# Patient Record
Sex: FEMALE | Race: WHITE | NOT HISPANIC OR LATINO | Employment: OTHER | URBAN - NONMETROPOLITAN AREA
[De-identification: names, ages, dates, MRNs, and addresses within clinical notes are randomized per-mention and may not be internally consistent; named-entity substitution may affect disease eponyms.]

---

## 2018-01-01 ENCOUNTER — APPOINTMENT (OUTPATIENT)
Dept: CT IMAGING | Facility: HOSPITAL | Age: 63
End: 2018-01-01

## 2018-01-01 ENCOUNTER — HOSPITAL ENCOUNTER (INPATIENT)
Facility: HOSPITAL | Age: 63
LOS: 3 days | Discharge: HOSPICE | End: 2018-10-26
Attending: EMERGENCY MEDICINE | Admitting: FAMILY MEDICINE

## 2018-01-01 ENCOUNTER — DOCUMENTATION (OUTPATIENT)
Dept: ONCOLOGY | Facility: HOSPITAL | Age: 63
End: 2018-01-01

## 2018-01-01 ENCOUNTER — CONSULT (OUTPATIENT)
Dept: ONCOLOGY | Facility: CLINIC | Age: 63
End: 2018-01-01

## 2018-01-01 ENCOUNTER — HOSPITAL ENCOUNTER (OUTPATIENT)
Dept: MRI IMAGING | Facility: HOSPITAL | Age: 63
Discharge: HOME | End: 2018-10-05
Attending: INTERNAL MEDICINE | Admitting: INTERNAL MEDICINE

## 2018-01-01 ENCOUNTER — OFFICE VISIT (OUTPATIENT)
Dept: ONCOLOGY | Facility: CLINIC | Age: 63
End: 2018-01-01

## 2018-01-01 ENCOUNTER — INFUSION (OUTPATIENT)
Dept: ONCOLOGY | Facility: HOSPITAL | Age: 63
End: 2018-01-01

## 2018-01-01 ENCOUNTER — APPOINTMENT (OUTPATIENT)
Dept: SPEECH THERAPY | Facility: HOSPITAL | Age: 63
End: 2018-01-01
Attending: INTERNAL MEDICINE

## 2018-01-01 ENCOUNTER — HOSPITAL ENCOUNTER (OUTPATIENT)
Dept: CT IMAGING | Facility: HOSPITAL | Age: 63
Discharge: HOME | End: 2018-10-08
Attending: INTERNAL MEDICINE

## 2018-01-01 ENCOUNTER — HOSPITAL ENCOUNTER (OUTPATIENT)
Dept: CT IMAGING | Facility: HOSPITAL | Age: 63
Discharge: HOME | End: 2018-10-08
Attending: INTERNAL MEDICINE | Admitting: INTERNAL MEDICINE

## 2018-01-01 ENCOUNTER — APPOINTMENT (OUTPATIENT)
Dept: ONCOLOGY | Facility: HOSPITAL | Age: 63
End: 2018-01-01

## 2018-01-01 ENCOUNTER — APPOINTMENT (OUTPATIENT)
Dept: LAB | Facility: HOSPITAL | Age: 63
End: 2018-01-01

## 2018-01-01 ENCOUNTER — INFUSION (OUTPATIENT)
Dept: ONCOLOGY | Facility: HOSPITAL | Age: 63
End: 2018-01-01
Attending: INTERNAL MEDICINE

## 2018-01-01 ENCOUNTER — TELEPHONE (OUTPATIENT)
Dept: ONCOLOGY | Facility: CLINIC | Age: 63
End: 2018-01-01

## 2018-01-01 ENCOUNTER — HOSPITAL ENCOUNTER (OUTPATIENT)
Age: 63
Setting detail: OUTPATIENT SURGERY
Discharge: HOME | End: 2018-10-11
Attending: SPECIALIST | Admitting: SPECIALIST

## 2018-01-01 ENCOUNTER — HOSPITAL ENCOUNTER (OUTPATIENT)
Dept: ULTRASOUND IMAGING | Facility: HOSPITAL | Age: 63
Discharge: HOME | End: 2018-10-17
Attending: INTERNAL MEDICINE | Admitting: INTERNAL MEDICINE

## 2018-01-01 ENCOUNTER — HOSPITAL ENCOUNTER (OUTPATIENT)
Dept: GENERAL RADIOLOGY | Age: 63
Discharge: HOME | End: 2018-10-11
Payer: MEDICARE

## 2018-01-01 ENCOUNTER — ANESTHESIA EVENT (OUTPATIENT)
Dept: OPERATING ROOM | Age: 63
End: 2018-01-01

## 2018-01-01 ENCOUNTER — HOSPITAL ENCOUNTER (OUTPATIENT)
Dept: PREADMISSION TESTING | Age: 63
Setting detail: OUTPATIENT SURGERY
Discharge: HOME | End: 2018-10-12

## 2018-01-01 ENCOUNTER — LAB (OUTPATIENT)
Dept: LAB | Facility: HOSPITAL | Age: 63
End: 2018-01-01

## 2018-01-01 ENCOUNTER — HOSPITAL ENCOUNTER (OUTPATIENT)
Dept: ULTRASOUND IMAGING | Facility: HOSPITAL | Age: 63
Discharge: HOME | End: 2018-10-17
Attending: INTERNAL MEDICINE

## 2018-01-01 ENCOUNTER — HOSPITAL ENCOUNTER (OUTPATIENT)
Dept: GENERAL RADIOLOGY | Age: 63
Discharge: HOME | End: 2018-10-08
Payer: MEDICARE

## 2018-01-01 ENCOUNTER — APPOINTMENT (OUTPATIENT)
Dept: GENERAL RADIOLOGY | Facility: HOSPITAL | Age: 63
End: 2018-01-01

## 2018-01-01 ENCOUNTER — APPOINTMENT (OUTPATIENT)
Dept: ONCOLOGY | Facility: HOSPITAL | Age: 63
End: 2018-01-01
Attending: INTERNAL MEDICINE

## 2018-01-01 ENCOUNTER — ANESTHESIA (OUTPATIENT)
Dept: OPERATING ROOM | Age: 63
End: 2018-01-01

## 2018-01-01 VITALS
HEART RATE: 91 BPM | OXYGEN SATURATION: 96 % | RESPIRATION RATE: 22 BRPM | HEIGHT: 63 IN | DIASTOLIC BLOOD PRESSURE: 61 MMHG | TEMPERATURE: 98.4 F | WEIGHT: 119 LBS | SYSTOLIC BLOOD PRESSURE: 116 MMHG | BODY MASS INDEX: 21.09 KG/M2

## 2018-01-01 VITALS
RESPIRATION RATE: 16 BRPM | TEMPERATURE: 97.7 F | DIASTOLIC BLOOD PRESSURE: 68 MMHG | BODY MASS INDEX: 21.09 KG/M2 | HEIGHT: 63 IN | SYSTOLIC BLOOD PRESSURE: 112 MMHG | OXYGEN SATURATION: 94 % | WEIGHT: 119 LBS | HEART RATE: 96 BPM

## 2018-01-01 VITALS
TEMPERATURE: 98.3 F | SYSTOLIC BLOOD PRESSURE: 98 MMHG | HEART RATE: 103 BPM | WEIGHT: 113 LBS | BODY MASS INDEX: 20.02 KG/M2 | HEIGHT: 63 IN | DIASTOLIC BLOOD PRESSURE: 66 MMHG | OXYGEN SATURATION: 90 % | RESPIRATION RATE: 16 BRPM

## 2018-01-01 VITALS
HEIGHT: 62 IN | TEMPERATURE: 98.2 F | SYSTOLIC BLOOD PRESSURE: 102 MMHG | RESPIRATION RATE: 18 BRPM | BODY MASS INDEX: 23.27 KG/M2 | DIASTOLIC BLOOD PRESSURE: 56 MMHG | HEART RATE: 106 BPM | OXYGEN SATURATION: 93 % | WEIGHT: 126.44 LBS

## 2018-01-01 VITALS
DIASTOLIC BLOOD PRESSURE: 74 MMHG | WEIGHT: 122.6 LBS | HEIGHT: 63 IN | SYSTOLIC BLOOD PRESSURE: 122 MMHG | OXYGEN SATURATION: 94 % | BODY MASS INDEX: 21.72 KG/M2 | RESPIRATION RATE: 18 BRPM | TEMPERATURE: 97.3 F | HEART RATE: 105 BPM

## 2018-01-01 VITALS — SYSTOLIC BLOOD PRESSURE: 111 MMHG | DIASTOLIC BLOOD PRESSURE: 69 MMHG | OXYGEN SATURATION: 99 %

## 2018-01-01 VITALS
BODY MASS INDEX: 20.02 KG/M2 | HEART RATE: 93 BPM | SYSTOLIC BLOOD PRESSURE: 91 MMHG | OXYGEN SATURATION: 94 % | WEIGHT: 113 LBS | HEIGHT: 63 IN | DIASTOLIC BLOOD PRESSURE: 60 MMHG | RESPIRATION RATE: 18 BRPM | TEMPERATURE: 97.9 F

## 2018-01-01 VITALS
RESPIRATION RATE: 16 BRPM | OXYGEN SATURATION: 90 % | TEMPERATURE: 98.4 F | WEIGHT: 113 LBS | DIASTOLIC BLOOD PRESSURE: 62 MMHG | HEIGHT: 63 IN | SYSTOLIC BLOOD PRESSURE: 108 MMHG | HEART RATE: 96 BPM | BODY MASS INDEX: 20.02 KG/M2

## 2018-01-01 VITALS — HEIGHT: 63 IN | BODY MASS INDEX: 20.04 KG/M2 | WEIGHT: 113.1 LBS

## 2018-01-01 DIAGNOSIS — N28.9 ACUTE RENAL INSUFFICIENCY: ICD-10-CM

## 2018-01-01 DIAGNOSIS — C78.7 COLON CANCER METASTASIZED TO LIVER (HCC): Primary | ICD-10-CM

## 2018-01-01 DIAGNOSIS — R53.1 GENERALIZED WEAKNESS: ICD-10-CM

## 2018-01-01 DIAGNOSIS — C18.9 MALIGNANT NEOPLASM OF COLON, UNSPECIFIED PART OF COLON (HCC): ICD-10-CM

## 2018-01-01 DIAGNOSIS — C18.6 MALIGNANT NEOPLASM OF DESCENDING COLON (HCC): ICD-10-CM

## 2018-01-01 DIAGNOSIS — C79.9 METASTATIC CANCER (HCC): ICD-10-CM

## 2018-01-01 DIAGNOSIS — Z13.29 SCREENING FOR THYROID DISORDER: Primary | ICD-10-CM

## 2018-01-01 DIAGNOSIS — E87.6 HYPOKALEMIA: Primary | ICD-10-CM

## 2018-01-01 DIAGNOSIS — C18.0 MALIGNANT NEOPLASM OF CECUM (HCC): ICD-10-CM

## 2018-01-01 DIAGNOSIS — C18.9 COLON CANCER METASTASIZED TO LIVER (HCC): Primary | ICD-10-CM

## 2018-01-01 DIAGNOSIS — N17.9 ACUTE RENAL FAILURE, UNSPECIFIED ACUTE RENAL FAILURE TYPE (HCC): ICD-10-CM

## 2018-01-01 DIAGNOSIS — R18.0 MALIGNANT ASCITES: ICD-10-CM

## 2018-01-01 DIAGNOSIS — R14.0 ABDOMINAL DISTENTION: ICD-10-CM

## 2018-01-01 DIAGNOSIS — Z01.811 PRE-OP CHEST EXAM: ICD-10-CM

## 2018-01-01 DIAGNOSIS — C18.6 MALIGNANT NEOPLASM OF DESCENDING COLON (HCC): Primary | ICD-10-CM

## 2018-01-01 DIAGNOSIS — E87.6 HYPOKALEMIA: ICD-10-CM

## 2018-01-01 DIAGNOSIS — G89.3 PAIN, CANCER: ICD-10-CM

## 2018-01-01 DIAGNOSIS — E87.5 HYPERKALEMIA: Primary | ICD-10-CM

## 2018-01-01 DIAGNOSIS — R14.0 ABDOMINAL DISTENTION: Primary | ICD-10-CM

## 2018-01-01 DIAGNOSIS — D64.9 ANEMIA, UNSPECIFIED TYPE: Primary | ICD-10-CM

## 2018-01-01 LAB
ALBUMIN SERPL-MCNC: 2.3 G/DL (ref 3.5–5)
ALBUMIN SERPL-MCNC: 2.8 G/DL (ref 3.5–5)
ALBUMIN SERPL-MCNC: 2.9 G/DL (ref 3.5–5)
ALBUMIN SERPL-MCNC: 3.2 G/DL (ref 3.5–5)
ALBUMIN SERPL-MCNC: 3.4 G/DL (ref 3.5–5)
ALBUMIN/GLOB SERPL: 0.9 G/DL (ref 1.1–2.5)
ALBUMIN/GLOB SERPL: 1 G/DL (ref 1.1–2.5)
ALBUMIN/GLOB SERPL: 1.1 G/DL (ref 1.1–2.5)
ALP SERPL-CCNC: 1167 U/L (ref 24–120)
ALP SERPL-CCNC: 1190 U/L (ref 24–120)
ALP SERPL-CCNC: 1411 U/L (ref 24–120)
ALP SERPL-CCNC: 436 U/L (ref 24–120)
ALP SERPL-CCNC: 720 U/L (ref 24–120)
ALT SERPL W P-5'-P-CCNC: 47 U/L (ref 0–54)
ALT SERPL W P-5'-P-CCNC: 47 U/L (ref 0–54)
ALT SERPL W P-5'-P-CCNC: 52 U/L (ref 0–54)
ALT SERPL W P-5'-P-CCNC: 52 U/L (ref 0–54)
ALT SERPL W P-5'-P-CCNC: 53 U/L (ref 0–54)
ANION GAP SERPL CALCULATED.3IONS-SCNC: 10 MMOL/L (ref 4–13)
ANION GAP SERPL CALCULATED.3IONS-SCNC: 14 MMOL/L (ref 4–13)
ANION GAP SERPL CALCULATED.3IONS-SCNC: 17 MMOL/L (ref 4–13)
ANION GAP SERPL CALCULATED.3IONS-SCNC: 18 MMOL/L (ref 4–13)
ANION GAP SERPL CALCULATED.3IONS-SCNC: 19 MMOL/L (ref 4–13)
ANION GAP SERPL CALCULATED.3IONS-SCNC: 22 MMOL/L (ref 4–13)
ANISOCYTOSIS BLD QL: ABNORMAL
APTT PPP: 29.7 SECONDS (ref 24.1–34.8)
AST SERPL-CCNC: 244 U/L (ref 7–45)
AST SERPL-CCNC: 252 U/L (ref 7–45)
AST SERPL-CCNC: 253 U/L (ref 7–45)
AST SERPL-CCNC: 307 U/L (ref 7–45)
AST SERPL-CCNC: 343 U/L (ref 7–45)
BACTERIA SPEC AEROBE CULT: NORMAL
BACTERIA SPEC AEROBE CULT: NORMAL
BACTERIA UR QL AUTO: ABNORMAL /HPF
BASOPHILS # BLD AUTO: 0.02 10*3/MM3 (ref 0–0.2)
BASOPHILS # BLD AUTO: 0.02 10*3/MM3 (ref 0–0.2)
BASOPHILS # BLD AUTO: 0.05 10*3/MM3 (ref 0–0.2)
BASOPHILS # BLD AUTO: 0.05 10*3/MM3 (ref 0–0.2)
BASOPHILS # BLD AUTO: 0.07 10*3/MM3 (ref 0–0.2)
BASOPHILS NFR BLD AUTO: 0.1 % (ref 0–2)
BASOPHILS NFR BLD AUTO: 0.1 % (ref 0–2)
BASOPHILS NFR BLD AUTO: 0.3 % (ref 0–2)
BASOPHILS NFR BLD AUTO: 0.5 % (ref 0–2)
BASOPHILS NFR BLD AUTO: 0.6 % (ref 0–2)
BILE AC SERPL-SCNC: 12 UMOL/L
BILIRUB SERPL-MCNC: 2 MG/DL (ref 0.1–1)
BILIRUB SERPL-MCNC: 4 MG/DL (ref 0.1–1)
BILIRUB SERPL-MCNC: 4.6 MG/DL (ref 0.1–1)
BILIRUB SERPL-MCNC: 5 MG/DL (ref 0.1–1)
BILIRUB SERPL-MCNC: 5.4 MG/DL (ref 0.1–1)
BILIRUB UR QL STRIP: ABNORMAL
BUN BLD-MCNC: 30 MG/DL (ref 5–21)
BUN BLD-MCNC: 44 MG/DL (ref 5–21)
BUN BLD-MCNC: 50 MG/DL (ref 5–21)
BUN BLD-MCNC: 51 MG/DL (ref 5–21)
BUN BLD-MCNC: 53 MG/DL (ref 5–21)
BUN BLD-MCNC: 56 MG/DL (ref 5–21)
BUN BLD-MCNC: 57 MG/DL (ref 5–21)
BUN BLD-MCNC: 9 MG/DL (ref 5–21)
BUN/CREAT SERPL: 12.3 (ref 7–25)
BUN/CREAT SERPL: 12.8 (ref 7–25)
BUN/CREAT SERPL: 14.6 (ref 7–25)
BUN/CREAT SERPL: 14.6 (ref 7–25)
BUN/CREAT SERPL: 15.1 (ref 7–25)
BUN/CREAT SERPL: 17 (ref 7–25)
BUN/CREAT SERPL: 20.1 (ref 7–25)
BUN/CREAT SERPL: 9.6 (ref 7–25)
CALCIUM SPEC-SCNC: 6 MG/DL (ref 8.4–10.4)
CALCIUM SPEC-SCNC: 7 MG/DL (ref 8.4–10.4)
CALCIUM SPEC-SCNC: 7.2 MG/DL (ref 8.4–10.4)
CALCIUM SPEC-SCNC: 7.2 MG/DL (ref 8.4–10.4)
CALCIUM SPEC-SCNC: 7.7 MG/DL (ref 8.4–10.4)
CALCIUM SPEC-SCNC: 7.8 MG/DL (ref 8.4–10.4)
CALCIUM SPEC-SCNC: 7.9 MG/DL (ref 8.4–10.4)
CALCIUM SPEC-SCNC: 8 MG/DL (ref 8.4–10.4)
CDCAE SERPL-SCNC: 8.1 UMOL/L
CHLORIDE SERPL-SCNC: 100 MMOL/L (ref 98–110)
CHLORIDE SERPL-SCNC: 100 MMOL/L (ref 98–110)
CHLORIDE SERPL-SCNC: 103 MMOL/L (ref 98–110)
CHLORIDE SERPL-SCNC: 88 MMOL/L (ref 98–110)
CHLORIDE SERPL-SCNC: 88 MMOL/L (ref 98–110)
CHLORIDE SERPL-SCNC: 94 MMOL/L (ref 98–110)
CHLORIDE SERPL-SCNC: 95 MMOL/L (ref 98–110)
CHLORIDE SERPL-SCNC: 97 MMOL/L (ref 98–110)
CHOLATE SERPL-SCNC: 3 UMOL/L
CLARITY UR: ABNORMAL
CO2 SERPL-SCNC: 16 MMOL/L (ref 24–31)
CO2 SERPL-SCNC: 17 MMOL/L (ref 24–31)
CO2 SERPL-SCNC: 17 MMOL/L (ref 24–31)
CO2 SERPL-SCNC: 19 MMOL/L (ref 24–31)
CO2 SERPL-SCNC: 21 MMOL/L (ref 24–31)
CO2 SERPL-SCNC: 23 MMOL/L (ref 24–31)
CO2 SERPL-SCNC: 28 MMOL/L (ref 24–31)
CO2 SERPL-SCNC: 37 MMOL/L (ref 24–31)
COLOR UR: ABNORMAL
CREAT BLD-MCNC: 0.94 MG/DL (ref 0.5–1.4)
CREAT BLD-MCNC: 2.44 MG/DL (ref 0.5–1.4)
CREAT BLD-MCNC: 2.84 MG/DL (ref 0.5–1.4)
CREAT BLD-MCNC: 3.29 MG/DL (ref 0.5–1.4)
CREAT BLD-MCNC: 3.38 MG/DL (ref 0.5–1.4)
CREAT BLD-MCNC: 3.42 MG/DL (ref 0.5–1.4)
CREAT BLD-MCNC: 3.44 MG/DL (ref 0.5–1.4)
CREAT BLD-MCNC: 3.63 MG/DL (ref 0.5–1.4)
CREAT UR-MCNC: 37.3 MG/DL
D-LACTATE SERPL-SCNC: 2.4 MMOL/L (ref 0.5–2)
D-LACTATE SERPL-SCNC: 4 MMOL/L (ref 0.5–2)
DEPRECATED RDW RBC AUTO: 59.7 FL (ref 40–54)
DEPRECATED RDW RBC AUTO: 75.3 FL (ref 40–54)
DEPRECATED RDW RBC AUTO: 76.9 FL (ref 40–54)
DEPRECATED RDW RBC AUTO: 79 FL (ref 40–54)
DEPRECATED RDW RBC AUTO: 79.5 FL (ref 40–54)
DEPRECATED RDW RBC AUTO: 85 FL (ref 40–54)
DO-CHOLATE SERPL-SCNC: 0.5 UMOL/L
EOSINOPHIL # BLD AUTO: 0.01 10*3/MM3 (ref 0–0.7)
EOSINOPHIL # BLD AUTO: 0.01 10*3/MM3 (ref 0–0.7)
EOSINOPHIL # BLD AUTO: 0.04 10*3/MM3 (ref 0–0.7)
EOSINOPHIL # BLD AUTO: 0.04 10*3/MM3 (ref 0–0.7)
EOSINOPHIL # BLD AUTO: 0.1 10*3/MM3 (ref 0–0.7)
EOSINOPHIL # BLD MANUAL: 0.13 10*3/MM3 (ref 0–0.7)
EOSINOPHIL NFR BLD AUTO: 0.1 % (ref 0–4)
EOSINOPHIL NFR BLD AUTO: 0.1 % (ref 0–4)
EOSINOPHIL NFR BLD AUTO: 0.3 % (ref 0–4)
EOSINOPHIL NFR BLD AUTO: 0.4 % (ref 0–4)
EOSINOPHIL NFR BLD AUTO: 0.8 % (ref 0–4)
EOSINOPHIL NFR BLD MANUAL: 3 % (ref 0–4)
ERYTHROCYTE [DISTWIDTH] IN BLOOD BY AUTOMATED COUNT: 18.2 % (ref 12–15)
ERYTHROCYTE [DISTWIDTH] IN BLOOD BY AUTOMATED COUNT: 22 % (ref 12–15)
ERYTHROCYTE [DISTWIDTH] IN BLOOD BY AUTOMATED COUNT: 22.3 % (ref 12–15)
ERYTHROCYTE [DISTWIDTH] IN BLOOD BY AUTOMATED COUNT: 22.3 % (ref 12–15)
ERYTHROCYTE [DISTWIDTH] IN BLOOD BY AUTOMATED COUNT: 22.4 % (ref 12–15)
ERYTHROCYTE [DISTWIDTH] IN BLOOD BY AUTOMATED COUNT: 22.5 % (ref 12–15)
FERRITIN SERPL-MCNC: 980 NG/ML (ref 11.1–264)
FOLATE SERPL-MCNC: 5.61 NG/ML
GFR SERPL CREATININE-BSD FRML MDRD: 13 ML/MIN/1.73
GFR SERPL CREATININE-BSD FRML MDRD: 14 ML/MIN/1.73
GFR SERPL CREATININE-BSD FRML MDRD: 14 ML/MIN/1.73
GFR SERPL CREATININE-BSD FRML MDRD: 17 ML/MIN/1.73
GFR SERPL CREATININE-BSD FRML MDRD: 20 ML/MIN/1.73
GFR SERPL CREATININE-BSD FRML MDRD: 60 ML/MIN/1.73
GFR SERPL CREATININE-BSD FRML MDRD: ABNORMAL ML/MIN/1.73
GLOBULIN UR ELPH-MCNC: 2.5 GM/DL
GLOBULIN UR ELPH-MCNC: 3 GM/DL
GLOBULIN UR ELPH-MCNC: 3.1 GM/DL
GLOBULIN UR ELPH-MCNC: 3.2 GM/DL
GLOBULIN UR ELPH-MCNC: 3.5 GM/DL
GLUCOSE BLD-MCNC: 65 MG/DL (ref 70–100)
GLUCOSE BLD-MCNC: 75 MG/DL (ref 70–100)
GLUCOSE BLD-MCNC: 81 MG/DL (ref 70–100)
GLUCOSE BLD-MCNC: 83 MG/DL (ref 70–100)
GLUCOSE BLD-MCNC: 86 MG/DL (ref 70–100)
GLUCOSE BLD-MCNC: 90 MG/DL (ref 70–100)
GLUCOSE BLD-MCNC: 97 MG/DL (ref 70–100)
GLUCOSE BLD-MCNC: 98 MG/DL (ref 70–100)
GLUCOSE UR STRIP-MCNC: NEGATIVE MG/DL
HCT VFR BLD AUTO: 25 % (ref 37–47)
HCT VFR BLD AUTO: 27.8 % (ref 37–47)
HCT VFR BLD AUTO: 28.3 % (ref 37–47)
HCT VFR BLD AUTO: 29.2 % (ref 37–47)
HCT VFR BLD AUTO: 31.7 % (ref 37–47)
HCT VFR BLD AUTO: 32.6 % (ref 37–47)
HGB BLD-MCNC: 10.1 G/DL (ref 12–16)
HGB BLD-MCNC: 10.8 G/DL (ref 12–16)
HGB BLD-MCNC: 8.3 G/DL (ref 12–16)
HGB BLD-MCNC: 9 G/DL (ref 12–16)
HGB BLD-MCNC: 9 G/DL (ref 12–16)
HGB BLD-MCNC: 9.2 G/DL (ref 12–16)
HGB UR QL STRIP.AUTO: ABNORMAL
HOLD SPECIMEN: NORMAL
HYALINE CASTS UR QL AUTO: ABNORMAL /LPF
IMM GRANULOCYTES # BLD: 0.09 10*3/MM3 (ref 0–0.03)
IMM GRANULOCYTES # BLD: 0.15 10*3/MM3 (ref 0–0.03)
IMM GRANULOCYTES # BLD: 0.19 10*3/MM3 (ref 0–0.03)
IMM GRANULOCYTES # BLD: 0.24 10*3/MM3 (ref 0–0.03)
IMM GRANULOCYTES # BLD: 0.39 10*3/MM3 (ref 0–0.03)
IMM GRANULOCYTES NFR BLD: 0.8 % (ref 0–5)
IMM GRANULOCYTES NFR BLD: 1.4 % (ref 0–5)
IMM GRANULOCYTES NFR BLD: 1.4 % (ref 0–5)
IMM GRANULOCYTES NFR BLD: 1.6 % (ref 0–5)
IMM GRANULOCYTES NFR BLD: 2.5 % (ref 0–5)
INR PPP: 1.23 (ref 0.91–1.09)
IRON 24H UR-MRATE: 43 MCG/DL (ref 42–180)
IRON SATN MFR SERPL: 30 % (ref 20–45)
KETONES UR QL STRIP: NEGATIVE
LEUKOCYTE ESTERASE UR QL STRIP.AUTO: ABNORMAL
LIPASE SERPL-CCNC: 22 U/L (ref 23–203)
LYMPHOCYTES # BLD AUTO: 0.76 10*3/MM3 (ref 0.72–4.86)
LYMPHOCYTES # BLD AUTO: 0.83 10*3/MM3 (ref 0.72–4.86)
LYMPHOCYTES # BLD AUTO: 0.84 10*3/MM3 (ref 0.72–4.86)
LYMPHOCYTES # BLD AUTO: 1.03 10*3/MM3 (ref 0.72–4.86)
LYMPHOCYTES # BLD AUTO: 1.47 10*3/MM3 (ref 0.72–4.86)
LYMPHOCYTES # BLD MANUAL: 0.62 10*3/MM3 (ref 0.72–4.86)
LYMPHOCYTES NFR BLD AUTO: 12.4 % (ref 15–45)
LYMPHOCYTES NFR BLD AUTO: 5.6 % (ref 15–45)
LYMPHOCYTES NFR BLD AUTO: 5.7 % (ref 15–45)
LYMPHOCYTES NFR BLD AUTO: 6.6 % (ref 15–45)
LYMPHOCYTES NFR BLD AUTO: 7.8 % (ref 15–45)
LYMPHOCYTES NFR BLD MANUAL: 14 % (ref 15–45)
LYMPHOCYTES NFR BLD MANUAL: 6 % (ref 4–12)
MAGNESIUM SERPL-MCNC: 1.8 MG/DL (ref 1.4–2.2)
MCH RBC QN AUTO: 30.4 PG (ref 28–32)
MCH RBC QN AUTO: 31.4 PG (ref 28–32)
MCH RBC QN AUTO: 31.9 PG (ref 28–32)
MCH RBC QN AUTO: 31.9 PG (ref 28–32)
MCH RBC QN AUTO: 32 PG (ref 28–32)
MCH RBC QN AUTO: 32.1 PG (ref 28–32)
MCHC RBC AUTO-ENTMCNC: 30.8 G/DL (ref 33–36)
MCHC RBC AUTO-ENTMCNC: 31.9 G/DL (ref 33–36)
MCHC RBC AUTO-ENTMCNC: 32.4 G/DL (ref 33–36)
MCHC RBC AUTO-ENTMCNC: 32.5 G/DL (ref 33–36)
MCHC RBC AUTO-ENTMCNC: 33.1 G/DL (ref 33–36)
MCHC RBC AUTO-ENTMCNC: 33.2 G/DL (ref 33–36)
MCV RBC AUTO: 100 FL (ref 82–98)
MCV RBC AUTO: 103.5 FL (ref 82–98)
MCV RBC AUTO: 91.8 FL (ref 82–98)
MCV RBC AUTO: 96.5 FL (ref 82–98)
MCV RBC AUTO: 96.6 FL (ref 82–98)
MCV RBC AUTO: 99.3 FL (ref 82–98)
MONOCYTES # BLD AUTO: 0.27 10*3/MM3 (ref 0.19–1.3)
MONOCYTES # BLD AUTO: 0.56 10*3/MM3 (ref 0.19–1.3)
MONOCYTES # BLD AUTO: 0.58 10*3/MM3 (ref 0.19–1.3)
MONOCYTES # BLD AUTO: 0.94 10*3/MM3 (ref 0.19–1.3)
MONOCYTES # BLD AUTO: 0.96 10*3/MM3 (ref 0.19–1.3)
MONOCYTES # BLD AUTO: 1.05 10*3/MM3 (ref 0.19–1.3)
MONOCYTES NFR BLD AUTO: 4 % (ref 4–12)
MONOCYTES NFR BLD AUTO: 4.1 % (ref 4–12)
MONOCYTES NFR BLD AUTO: 6.7 % (ref 4–12)
MONOCYTES NFR BLD AUTO: 8.1 % (ref 4–12)
MONOCYTES NFR BLD AUTO: 8.8 % (ref 4–12)
NEUTROPHILS # BLD AUTO: 11.99 10*3/MM3 (ref 1.87–8.4)
NEUTROPHILS # BLD AUTO: 12.96 10*3/MM3 (ref 1.87–8.4)
NEUTROPHILS # BLD AUTO: 13.06 10*3/MM3 (ref 1.87–8.4)
NEUTROPHILS # BLD AUTO: 3.43 10*3/MM3 (ref 1.87–8.4)
NEUTROPHILS # BLD AUTO: 8.72 10*3/MM3 (ref 1.87–8.4)
NEUTROPHILS # BLD AUTO: 9.21 10*3/MM3 (ref 1.87–8.4)
NEUTROPHILS NFR BLD AUTO: 77.3 % (ref 39–78)
NEUTROPHILS NFR BLD AUTO: 81.1 % (ref 39–78)
NEUTROPHILS NFR BLD AUTO: 83.6 % (ref 39–78)
NEUTROPHILS NFR BLD AUTO: 88.5 % (ref 39–78)
NEUTROPHILS NFR BLD AUTO: 88.7 % (ref 39–78)
NEUTROPHILS NFR BLD MANUAL: 77 % (ref 39–78)
NITRITE UR QL STRIP: NEGATIVE
NRBC BLD MANUAL-RTO: 0 /100 WBC (ref 0–0)
NT-PROBNP SERPL-MCNC: 7580 PG/ML (ref 0–900)
PH UR STRIP.AUTO: <=5 [PH] (ref 5–8)
PLATELET # BLD AUTO: 194 10*3/MM3 (ref 130–400)
PLATELET # BLD AUTO: 211 10*3/MM3 (ref 130–400)
PLATELET # BLD AUTO: 216 10*3/MM3 (ref 130–400)
PLATELET # BLD AUTO: 411 10*3/MM3 (ref 130–400)
PLATELET # BLD AUTO: 521 10*3/MM3 (ref 130–400)
PLATELET # BLD AUTO: 75 10*3/MM3 (ref 130–400)
PMV BLD AUTO: 10.1 FL (ref 6–12)
PMV BLD AUTO: 10.1 FL (ref 6–12)
PMV BLD AUTO: 10.3 FL (ref 6–12)
PMV BLD AUTO: 9.3 FL (ref 6–12)
PMV BLD AUTO: 9.6 FL (ref 6–12)
PMV BLD AUTO: 9.7 FL (ref 6–12)
POIKILOCYTOSIS BLD QL SMEAR: ABNORMAL
POTASSIUM BLD-SCNC: 2.4 MMOL/L (ref 3.5–5.3)
POTASSIUM BLD-SCNC: 4.5 MMOL/L (ref 3.5–5.3)
POTASSIUM BLD-SCNC: 5 MMOL/L (ref 3.5–5.3)
POTASSIUM BLD-SCNC: 5.8 MMOL/L (ref 3.5–5.3)
POTASSIUM BLD-SCNC: 5.9 MMOL/L (ref 3.5–5.3)
POTASSIUM BLD-SCNC: 5.9 MMOL/L (ref 3.5–5.3)
POTASSIUM BLD-SCNC: 6.7 MMOL/L (ref 3.5–5.3)
POTASSIUM BLD-SCNC: 6.7 MMOL/L (ref 3.5–5.3)
PROT SERPL-MCNC: 4.8 G/DL (ref 6.3–8.7)
PROT SERPL-MCNC: 5.9 G/DL (ref 6.3–8.7)
PROT SERPL-MCNC: 6.1 G/DL (ref 6.3–8.7)
PROT SERPL-MCNC: 6.2 G/DL (ref 6.3–8.7)
PROT SERPL-MCNC: 6.9 G/DL (ref 6.3–8.7)
PROT UR QL STRIP: ABNORMAL
PROT UR-MCNC: 83 MG/DL (ref 0–13.5)
PROTHROMBIN TIME: 15.9 SECONDS (ref 11.9–14.6)
RBC # BLD AUTO: 2.59 10*6/MM3 (ref 4.2–5.4)
RBC # BLD AUTO: 2.8 10*6/MM3 (ref 4.2–5.4)
RBC # BLD AUTO: 2.82 10*6/MM3 (ref 4.2–5.4)
RBC # BLD AUTO: 2.93 10*6/MM3 (ref 4.2–5.4)
RBC # BLD AUTO: 3.17 10*6/MM3 (ref 4.2–5.4)
RBC # BLD AUTO: 3.55 10*6/MM3 (ref 4.2–5.4)
RBC # UR: ABNORMAL /HPF
REF LAB TEST METHOD: ABNORMAL
SMALL PLATELETS BLD QL SMEAR: ABNORMAL
SODIUM BLD-SCNC: 132 MMOL/L (ref 135–145)
SODIUM BLD-SCNC: 132 MMOL/L (ref 135–145)
SODIUM BLD-SCNC: 133 MMOL/L (ref 135–145)
SODIUM BLD-SCNC: 134 MMOL/L (ref 135–145)
SODIUM BLD-SCNC: 134 MMOL/L (ref 135–145)
SODIUM BLD-SCNC: 135 MMOL/L (ref 135–145)
SODIUM BLD-SCNC: 135 MMOL/L (ref 135–145)
SODIUM BLD-SCNC: 136 MMOL/L (ref 135–145)
SODIUM UR-SCNC: 35 MMOL/L (ref 30–90)
SP GR UR STRIP: 1.02 (ref 1–1.03)
SQUAMOUS #/AREA URNS HPF: ABNORMAL /HPF
TIBC SERPL-MCNC: 144 MCG/DL (ref 225–420)
TROPONIN I SERPL-MCNC: <0.012 NG/ML (ref 0–0.03)
UROBILINOGEN UR QL STRIP: ABNORMAL
URSODEOXYCHOLATE: 0.7 UMOL/L
VIT B12 BLD-MCNC: 726 PG/ML (ref 239–931)
WBC MORPH BLD: NORMAL
WBC NRBC COR # BLD: 10.74 10*3/MM3 (ref 4.8–10.8)
WBC NRBC COR # BLD: 11.9 10*3/MM3 (ref 4.8–10.8)
WBC NRBC COR # BLD: 13.53 10*3/MM3 (ref 4.8–10.8)
WBC NRBC COR # BLD: 14.64 10*3/MM3 (ref 4.8–10.8)
WBC NRBC COR # BLD: 15.62 10*3/MM3 (ref 4.8–10.8)
WBC NRBC COR # BLD: 4.45 10*3/MM3 (ref 4.8–10.8)
WBC UR QL AUTO: ABNORMAL /HPF

## 2018-01-01 PROCEDURE — 25010000003 DEXAMETHASONE SODIUM PHOSPHATE 100 MG/10ML SOLUTION: Performed by: INTERNAL MEDICINE

## 2018-01-01 PROCEDURE — 84300 ASSAY OF URINE SODIUM: CPT | Performed by: NURSE PRACTITIONER

## 2018-01-01 PROCEDURE — 94799 UNLISTED PULMONARY SVC/PX: CPT

## 2018-01-01 PROCEDURE — A9552 F18 FDG: HCPCS | Performed by: INTERNAL MEDICINE

## 2018-01-01 PROCEDURE — 63710000001 INSULIN REGULAR HUMAN PER 5 UNITS: Performed by: EMERGENCY MEDICINE

## 2018-01-01 PROCEDURE — 99497 ADVNCD CARE PLAN 30 MIN: CPT | Performed by: CLINICAL NURSE SPECIALIST

## 2018-01-01 PROCEDURE — 36415 COLL VENOUS BLD VENIPUNCTURE: CPT | Performed by: EMERGENCY MEDICINE

## 2018-01-01 PROCEDURE — 74176 CT ABD & PELVIS W/O CONTRAST: CPT

## 2018-01-01 PROCEDURE — 82607 VITAMIN B-12: CPT | Performed by: INTERNAL MEDICINE

## 2018-01-01 PROCEDURE — 25010000002 FLUOROURACIL PER 500 MG: Performed by: INTERNAL MEDICINE

## 2018-01-01 PROCEDURE — 82570 ASSAY OF URINE CREATININE: CPT | Performed by: NURSE PRACTITIONER

## 2018-01-01 PROCEDURE — 99214 OFFICE O/P EST MOD 30 MIN: CPT | Performed by: INTERNAL MEDICINE

## 2018-01-01 PROCEDURE — 80053 COMPREHEN METABOLIC PANEL: CPT

## 2018-01-01 PROCEDURE — 51703 INSERT BLADDER CATH COMPLEX: CPT

## 2018-01-01 PROCEDURE — A9577 INJ MULTIHANCE: HCPCS | Performed by: INTERNAL MEDICINE

## 2018-01-01 PROCEDURE — 51798 US URINE CAPACITY MEASURE: CPT

## 2018-01-01 PROCEDURE — 94760 N-INVAS EAR/PLS OXIMETRY 1: CPT

## 2018-01-01 PROCEDURE — 85025 COMPLETE CBC W/AUTO DIFF WBC: CPT | Performed by: FAMILY MEDICINE

## 2018-01-01 PROCEDURE — 83880 ASSAY OF NATRIURETIC PEPTIDE: CPT | Performed by: EMERGENCY MEDICINE

## 2018-01-01 PROCEDURE — G0378 HOSPITAL OBSERVATION PER HR: HCPCS

## 2018-01-01 PROCEDURE — 85025 COMPLETE CBC W/AUTO DIFF WBC: CPT | Performed by: INTERNAL MEDICINE

## 2018-01-01 PROCEDURE — 83690 ASSAY OF LIPASE: CPT | Performed by: EMERGENCY MEDICINE

## 2018-01-01 PROCEDURE — 25010000002 ONDANSETRON PER 1 MG: Performed by: FAMILY MEDICINE

## 2018-01-01 PROCEDURE — 36415 COLL VENOUS BLD VENIPUNCTURE: CPT

## 2018-01-01 PROCEDURE — 99205 OFFICE O/P NEW HI 60 MIN: CPT | Performed by: INTERNAL MEDICINE

## 2018-01-01 PROCEDURE — 93010 ELECTROCARDIOGRAM REPORT: CPT | Performed by: INTERNAL MEDICINE

## 2018-01-01 PROCEDURE — C1788 PORT, INDWELLING, IMP: HCPCS | Performed by: SPECIALIST

## 2018-01-01 PROCEDURE — G0498 CHEMO EXTEND IV INFUS W/PUMP: HCPCS

## 2018-01-01 PROCEDURE — 83605 ASSAY OF LACTIC ACID: CPT | Performed by: EMERGENCY MEDICINE

## 2018-01-01 PROCEDURE — 0 FLUDEOXYGLUCOSE F18 SOLUTION: Performed by: INTERNAL MEDICINE

## 2018-01-01 PROCEDURE — 80048 BASIC METABOLIC PNL TOTAL CA: CPT | Performed by: FAMILY MEDICINE

## 2018-01-01 PROCEDURE — G8907 PT DOC NO EVENTS ON DISCHARG: HCPCS

## 2018-01-01 PROCEDURE — P9612 CATHETERIZE FOR URINE SPEC: HCPCS

## 2018-01-01 PROCEDURE — 78815 PET IMAGE W/CT SKULL-THIGH: CPT

## 2018-01-01 PROCEDURE — 99215 OFFICE O/P EST HI 40 MIN: CPT | Performed by: INTERNAL MEDICINE

## 2018-01-01 PROCEDURE — 25010000002 PALONOSETRON PER 25 MCG: Performed by: INTERNAL MEDICINE

## 2018-01-01 PROCEDURE — 80048 BASIC METABOLIC PNL TOTAL CA: CPT | Performed by: NURSE PRACTITIONER

## 2018-01-01 PROCEDURE — G8918 PT W/O PREOP ORDER IV AB PRO: HCPCS

## 2018-01-01 PROCEDURE — 25010000002 CALCIUM GLUCONATE PER 10 ML: Performed by: EMERGENCY MEDICINE

## 2018-01-01 PROCEDURE — 93005 ELECTROCARDIOGRAM TRACING: CPT

## 2018-01-01 PROCEDURE — 87040 BLOOD CULTURE FOR BACTERIA: CPT | Performed by: EMERGENCY MEDICINE

## 2018-01-01 PROCEDURE — 85007 BL SMEAR W/DIFF WBC COUNT: CPT | Performed by: INTERNAL MEDICINE

## 2018-01-01 PROCEDURE — 85610 PROTHROMBIN TIME: CPT | Performed by: EMERGENCY MEDICINE

## 2018-01-01 PROCEDURE — 80053 COMPREHEN METABOLIC PANEL: CPT | Performed by: INTERNAL MEDICINE

## 2018-01-01 PROCEDURE — 96413 CHEMO IV INFUSION 1 HR: CPT

## 2018-01-01 PROCEDURE — 71045 X-RAY EXAM CHEST 1 VIEW: CPT

## 2018-01-01 PROCEDURE — 96367 TX/PROPH/DG ADDL SEQ IV INF: CPT

## 2018-01-01 PROCEDURE — 83550 IRON BINDING TEST: CPT | Performed by: INTERNAL MEDICINE

## 2018-01-01 PROCEDURE — 25010000002 LEUCOVORIN 200 MG RECONSTITUTED SOLUTION 1 EACH VIAL: Performed by: INTERNAL MEDICINE

## 2018-01-01 PROCEDURE — 85025 COMPLETE CBC W/AUTO DIFF WBC: CPT | Performed by: EMERGENCY MEDICINE

## 2018-01-01 PROCEDURE — 84484 ASSAY OF TROPONIN QUANT: CPT | Performed by: EMERGENCY MEDICINE

## 2018-01-01 PROCEDURE — 99284 EMERGENCY DEPT VISIT MOD MDM: CPT

## 2018-01-01 PROCEDURE — 82728 ASSAY OF FERRITIN: CPT | Performed by: INTERNAL MEDICINE

## 2018-01-01 PROCEDURE — 85730 THROMBOPLASTIN TIME PARTIAL: CPT | Performed by: EMERGENCY MEDICINE

## 2018-01-01 PROCEDURE — 85025 COMPLETE CBC W/AUTO DIFF WBC: CPT

## 2018-01-01 PROCEDURE — 93005 ELECTROCARDIOGRAM TRACING: CPT | Performed by: EMERGENCY MEDICINE

## 2018-01-01 PROCEDURE — 25010000002 MAGNESIUM SULFATE 2 GM/50ML SOLUTION: Performed by: INTERNAL MEDICINE

## 2018-01-01 PROCEDURE — 70553 MRI BRAIN STEM W/O & W/DYE: CPT

## 2018-01-01 PROCEDURE — 63710000001 INSULIN REGULAR HUMAN PER 5 UNITS: Performed by: NURSE PRACTITIONER

## 2018-01-01 PROCEDURE — 81001 URINALYSIS AUTO W/SCOPE: CPT | Performed by: EMERGENCY MEDICINE

## 2018-01-01 PROCEDURE — 0 GADOBENATE DIMEGLUMINE 529 MG/ML SOLUTION: Performed by: INTERNAL MEDICINE

## 2018-01-01 PROCEDURE — 96365 THER/PROPH/DIAG IV INF INIT: CPT

## 2018-01-01 PROCEDURE — 82542 COL CHROMOTOGRAPHY QUAL/QUAN: CPT | Performed by: INTERNAL MEDICINE

## 2018-01-01 PROCEDURE — 25010000002 POTASSIUM CHLORIDE PER 2 MEQ: Performed by: INTERNAL MEDICINE

## 2018-01-01 PROCEDURE — 63710000001 PROCHLORPERAZINE MALEATE PER 10 MG: Performed by: NURSE PRACTITIONER

## 2018-01-01 PROCEDURE — 25010000002 CALCIUM GLUCONATE PER 10 ML: Performed by: NURSE PRACTITIONER

## 2018-01-01 PROCEDURE — 99223 1ST HOSP IP/OBS HIGH 75: CPT | Performed by: CLINICAL NURSE SPECIALIST

## 2018-01-01 PROCEDURE — 94640 AIRWAY INHALATION TREATMENT: CPT

## 2018-01-01 PROCEDURE — 96368 THER/DIAG CONCURRENT INF: CPT

## 2018-01-01 PROCEDURE — 96366 THER/PROPH/DIAG IV INF ADDON: CPT

## 2018-01-01 PROCEDURE — 80053 COMPREHEN METABOLIC PANEL: CPT | Performed by: EMERGENCY MEDICINE

## 2018-01-01 PROCEDURE — 83540 ASSAY OF IRON: CPT | Performed by: INTERNAL MEDICINE

## 2018-01-01 PROCEDURE — 84156 ASSAY OF PROTEIN URINE: CPT | Performed by: NURSE PRACTITIONER

## 2018-01-01 PROCEDURE — 70450 CT HEAD/BRAIN W/O DYE: CPT

## 2018-01-01 PROCEDURE — 96415 CHEMO IV INFUSION ADDL HR: CPT

## 2018-01-01 PROCEDURE — 83735 ASSAY OF MAGNESIUM: CPT | Performed by: INTERNAL MEDICINE

## 2018-01-01 PROCEDURE — 72125 CT NECK SPINE W/O DYE: CPT

## 2018-01-01 PROCEDURE — 36561 INSERT TUNNELED CV CATH: CPT

## 2018-01-01 PROCEDURE — 96375 TX/PRO/DX INJ NEW DRUG ADDON: CPT

## 2018-01-01 PROCEDURE — 25010000002 OXALIPLATIN PER 0.5 MG: Performed by: INTERNAL MEDICINE

## 2018-01-01 PROCEDURE — 96411 CHEMO IV PUSH ADDL DRUG: CPT

## 2018-01-01 PROCEDURE — 76700 US EXAM ABDOM COMPLETE: CPT

## 2018-01-01 PROCEDURE — 0T9B70Z DRAINAGE OF BLADDER WITH DRAINAGE DEVICE, VIA NATURAL OR ARTIFICIAL OPENING: ICD-10-PCS | Performed by: FAMILY MEDICINE

## 2018-01-01 PROCEDURE — 82746 ASSAY OF FOLIC ACID SERUM: CPT | Performed by: INTERNAL MEDICINE

## 2018-01-01 PROCEDURE — 94644 CONT INHLJ TX 1ST HOUR: CPT

## 2018-01-01 DEVICE — PORT VASC ACCS SGL LUMN DETACHED SIL CATH 9.6 FR SMRT PRT: Type: IMPLANTABLE DEVICE | Site: CHEST | Status: FUNCTIONAL

## 2018-01-01 RX ORDER — MIDAZOLAM HYDROCHLORIDE 1 MG/ML
INJECTION INTRAMUSCULAR; INTRAVENOUS PRN
Status: DISCONTINUED | OUTPATIENT
Start: 2018-01-01 | End: 2018-01-01 | Stop reason: SDUPTHER

## 2018-01-01 RX ORDER — MEPERIDINE HYDROCHLORIDE 50 MG/ML
25 INJECTION INTRAMUSCULAR; INTRAVENOUS; SUBCUTANEOUS
Status: DISCONTINUED | OUTPATIENT
Start: 2018-01-01 | End: 2018-01-01 | Stop reason: HOSPADM

## 2018-01-01 RX ORDER — PALONOSETRON 0.05 MG/ML
0.25 INJECTION, SOLUTION INTRAVENOUS ONCE
Status: COMPLETED | OUTPATIENT
Start: 2018-01-01 | End: 2018-01-01

## 2018-01-01 RX ORDER — HYDRALAZINE HYDROCHLORIDE 20 MG/ML
5 INJECTION INTRAMUSCULAR; INTRAVENOUS EVERY 10 MIN PRN
Status: DISCONTINUED | OUTPATIENT
Start: 2018-01-01 | End: 2018-01-01 | Stop reason: HOSPADM

## 2018-01-01 RX ORDER — SODIUM CHLORIDE 9 MG/ML
500 INJECTION, SOLUTION INTRAVENOUS CONTINUOUS
Start: 2018-01-01

## 2018-01-01 RX ORDER — MORPHINE SULFATE 10 MG/ML
2 INJECTION, SOLUTION INTRAMUSCULAR; INTRAVENOUS EVERY 5 MIN PRN
Status: DISCONTINUED | OUTPATIENT
Start: 2018-01-01 | End: 2018-01-01 | Stop reason: HOSPADM

## 2018-01-01 RX ORDER — OXYCODONE AND ACETAMINOPHEN 10; 325 MG/1; MG/1
1 TABLET ORAL EVERY 4 HOURS PRN
Status: DISCONTINUED | OUTPATIENT
Start: 2018-01-01 | End: 2018-01-01

## 2018-01-01 RX ORDER — PROCHLORPERAZINE MALEATE 10 MG
10 TABLET ORAL EVERY 6 HOURS PRN
Start: 2018-01-01

## 2018-01-01 RX ORDER — OMEPRAZOLE 40 MG/1
40 CAPSULE, DELAYED RELEASE ORAL DAILY
Qty: 90 CAPSULE | Refills: 3 | Status: SHIPPED | OUTPATIENT
Start: 2018-01-01

## 2018-01-01 RX ORDER — SODIUM CHLORIDE, SODIUM LACTATE, POTASSIUM CHLORIDE, CALCIUM CHLORIDE 600; 310; 30; 20 MG/100ML; MG/100ML; MG/100ML; MG/100ML
INJECTION, SOLUTION INTRAVENOUS CONTINUOUS
Status: CANCELLED | OUTPATIENT
Start: 2018-01-01

## 2018-01-01 RX ORDER — SODIUM CHLORIDE 0.9 % (FLUSH) 0.9 %
3-10 SYRINGE (ML) INJECTION AS NEEDED
Status: DISCONTINUED | OUTPATIENT
Start: 2018-01-01 | End: 2018-01-01 | Stop reason: HOSPADM

## 2018-01-01 RX ORDER — FLUOROURACIL 50 MG/ML
300 INJECTION, SOLUTION INTRAVENOUS ONCE
Status: CANCELLED | OUTPATIENT
Start: 2018-01-01

## 2018-01-01 RX ORDER — DIPHENHYDRAMINE HCL 25 MG
50 CAPSULE ORAL ONCE
OUTPATIENT
Start: 2018-01-01

## 2018-01-01 RX ORDER — PROPOFOL 10 MG/ML
INJECTION, EMULSION INTRAVENOUS PRN
Status: DISCONTINUED | OUTPATIENT
Start: 2018-01-01 | End: 2018-01-01 | Stop reason: SDUPTHER

## 2018-01-01 RX ORDER — POTASSIUM CHLORIDE 14.9 MG/ML
20 INJECTION INTRAVENOUS ONCE
Status: COMPLETED | OUTPATIENT
Start: 2018-01-01 | End: 2018-01-01

## 2018-01-01 RX ORDER — CALCIUM CARBONATE 500(1250)
1250 TABLET ORAL DAILY
Status: DISCONTINUED | OUTPATIENT
Start: 2018-01-01 | End: 2018-01-01 | Stop reason: HOSPADM

## 2018-01-01 RX ORDER — OXYCODONE AND ACETAMINOPHEN 10; 325 MG/1; MG/1
1 TABLET ORAL EVERY 6 HOURS PRN
Qty: 90 TABLET | Refills: 0 | Status: SHIPPED | OUTPATIENT
Start: 2018-01-01 | End: 2018-01-01 | Stop reason: HOSPADM

## 2018-01-01 RX ORDER — POTASSIUM CHLORIDE 750 MG/1
10 TABLET, FILM COATED, EXTENDED RELEASE ORAL 2 TIMES DAILY WITH MEALS
Qty: 90 TABLET | Refills: 3 | Status: SHIPPED | OUTPATIENT
Start: 2018-01-01 | End: 2018-01-01 | Stop reason: HOSPADM

## 2018-01-01 RX ORDER — ONDANSETRON 4 MG/1
4 TABLET, FILM COATED ORAL EVERY 8 HOURS PRN
Qty: 30 TABLET | Refills: 5 | Status: SHIPPED | OUTPATIENT
Start: 2018-01-01

## 2018-01-01 RX ORDER — HYDROMORPHONE HCL 110MG/55ML
0.5 PATIENT CONTROLLED ANALGESIA SYRINGE INTRAVENOUS EVERY 5 MIN PRN
Status: DISCONTINUED | OUTPATIENT
Start: 2018-01-01 | End: 2018-01-01 | Stop reason: HOSPADM

## 2018-01-01 RX ORDER — ALBUMIN, HUMAN INJ 5% 5 %
25 SOLUTION INTRAVENOUS ONCE
OUTPATIENT
Start: 2018-01-01

## 2018-01-01 RX ORDER — METOCLOPRAMIDE HYDROCHLORIDE 5 MG/ML
10 INJECTION INTRAMUSCULAR; INTRAVENOUS
Status: DISCONTINUED | OUTPATIENT
Start: 2018-01-01 | End: 2018-01-01 | Stop reason: HOSPADM

## 2018-01-01 RX ORDER — SODIUM CHLORIDE 9 MG/ML
250 INJECTION, SOLUTION INTRAVENOUS ONCE
Status: CANCELLED | OUTPATIENT
Start: 2018-01-01

## 2018-01-01 RX ORDER — OXYCODONE AND ACETAMINOPHEN 10; 325 MG/1; MG/1
1 TABLET ORAL ONCE
Status: COMPLETED | OUTPATIENT
Start: 2018-01-01 | End: 2018-01-01

## 2018-01-01 RX ORDER — ENALAPRILAT 2.5 MG/2ML
1.25 INJECTION INTRAVENOUS
Status: DISCONTINUED | OUTPATIENT
Start: 2018-01-01 | End: 2018-01-01 | Stop reason: HOSPADM

## 2018-01-01 RX ORDER — DEXTROSE MONOHYDRATE 25 G/50ML
50 INJECTION, SOLUTION INTRAVENOUS ONCE
Status: COMPLETED | OUTPATIENT
Start: 2018-01-01 | End: 2018-01-01

## 2018-01-01 RX ORDER — POTASSIUM CHLORIDE 14.9 MG/ML
20 INJECTION INTRAVENOUS ONCE
Status: CANCELLED | OUTPATIENT
Start: 2018-01-01 | End: 2018-01-01

## 2018-01-01 RX ORDER — PROMETHAZINE HYDROCHLORIDE 25 MG/ML
6.25 INJECTION, SOLUTION INTRAMUSCULAR; INTRAVENOUS
Status: DISCONTINUED | OUTPATIENT
Start: 2018-01-01 | End: 2018-01-01 | Stop reason: HOSPADM

## 2018-01-01 RX ORDER — FENTANYL CITRATE 50 UG/ML
INJECTION, SOLUTION INTRAMUSCULAR; INTRAVENOUS PRN
Status: DISCONTINUED | OUTPATIENT
Start: 2018-01-01 | End: 2018-01-01 | Stop reason: SDUPTHER

## 2018-01-01 RX ORDER — BISACODYL 5 MG/1
5 TABLET, DELAYED RELEASE ORAL DAILY
Status: DISCONTINUED | OUTPATIENT
Start: 2018-01-01 | End: 2018-01-01

## 2018-01-01 RX ORDER — SODIUM CHLORIDE 9 MG/ML
250 INJECTION, SOLUTION INTRAVENOUS ONCE
OUTPATIENT
Start: 2018-01-01

## 2018-01-01 RX ORDER — MAGNESIUM SULFATE HEPTAHYDRATE 40 MG/ML
2 INJECTION, SOLUTION INTRAVENOUS ONCE
Status: COMPLETED | OUTPATIENT
Start: 2018-01-01 | End: 2018-01-01

## 2018-01-01 RX ORDER — BISACODYL 5 MG/1
5 TABLET, DELAYED RELEASE ORAL DAILY
Qty: 60 TABLET | Refills: 4 | Status: SHIPPED | OUTPATIENT
Start: 2018-01-01

## 2018-01-01 RX ORDER — KETOROLAC TROMETHAMINE 30 MG/ML
INJECTION, SOLUTION INTRAMUSCULAR; INTRAVENOUS PRN
Status: DISCONTINUED | OUTPATIENT
Start: 2018-01-01 | End: 2018-01-01 | Stop reason: SDUPTHER

## 2018-01-01 RX ORDER — OXYCODONE HYDROCHLORIDE 5 MG/1
10 TABLET ORAL ONCE
Status: COMPLETED | OUTPATIENT
Start: 2018-01-01 | End: 2018-01-01

## 2018-01-01 RX ORDER — PANTOPRAZOLE SODIUM 40 MG/1
40 TABLET, DELAYED RELEASE ORAL
Status: DISCONTINUED | OUTPATIENT
Start: 2018-01-01 | End: 2018-01-01 | Stop reason: HOSPADM

## 2018-01-01 RX ORDER — OXYCODONE HYDROCHLORIDE 5 MG/1
10 TABLET ORAL EVERY 4 HOURS PRN
Status: DISCONTINUED | OUTPATIENT
Start: 2018-01-01 | End: 2018-01-01 | Stop reason: HOSPADM

## 2018-01-01 RX ORDER — DIPHENHYDRAMINE HYDROCHLORIDE 50 MG/ML
50 INJECTION INTRAMUSCULAR; INTRAVENOUS AS NEEDED
Status: CANCELLED | OUTPATIENT
Start: 2018-01-01

## 2018-01-01 RX ORDER — SODIUM CHLORIDE 9 MG/ML
125 INJECTION, SOLUTION INTRAVENOUS CONTINUOUS
Status: DISCONTINUED | OUTPATIENT
Start: 2018-01-01 | End: 2018-01-01

## 2018-01-01 RX ORDER — FLUOROURACIL 50 MG/ML
300 INJECTION, SOLUTION INTRAVENOUS ONCE
Status: COMPLETED | OUTPATIENT
Start: 2018-01-01 | End: 2018-01-01

## 2018-01-01 RX ORDER — PROCHLORPERAZINE MALEATE 10 MG
10 TABLET ORAL EVERY 6 HOURS PRN
Status: DISCONTINUED | OUTPATIENT
Start: 2018-01-01 | End: 2018-01-01 | Stop reason: HOSPADM

## 2018-01-01 RX ORDER — LIDOCAINE HYDROCHLORIDE 10 MG/ML
INJECTION, SOLUTION INFILTRATION; PERINEURAL PRN
Status: DISCONTINUED | OUTPATIENT
Start: 2018-01-01 | End: 2018-01-01 | Stop reason: HOSPADM

## 2018-01-01 RX ORDER — MEPERIDINE HYDROCHLORIDE 25 MG/ML
12.5 INJECTION INTRAMUSCULAR; INTRAVENOUS; SUBCUTANEOUS EVERY 5 MIN PRN
Status: DISCONTINUED | OUTPATIENT
Start: 2018-01-01 | End: 2018-01-01 | Stop reason: HOSPADM

## 2018-01-01 RX ORDER — LIDOCAINE HYDROCHLORIDE 10 MG/ML
1 INJECTION, SOLUTION EPIDURAL; INFILTRATION; INTRACAUDAL; PERINEURAL
Status: CANCELLED | OUTPATIENT
Start: 2018-01-01 | End: 2018-01-01

## 2018-01-01 RX ORDER — OXYCODONE HYDROCHLORIDE AND ACETAMINOPHEN 5; 325 MG/1; MG/1
1 TABLET ORAL ONCE
Status: DISCONTINUED | OUTPATIENT
Start: 2018-01-01 | End: 2018-01-01

## 2018-01-01 RX ORDER — SODIUM CHLORIDE 9 MG/ML
250 INJECTION, SOLUTION INTRAVENOUS ONCE
Status: COMPLETED | OUTPATIENT
Start: 2018-01-01 | End: 2018-01-01

## 2018-01-01 RX ORDER — DEXTROSE MONOHYDRATE 50 MG/ML
250 INJECTION, SOLUTION INTRAVENOUS ONCE
Status: COMPLETED | OUTPATIENT
Start: 2018-01-01 | End: 2018-01-01

## 2018-01-01 RX ORDER — OXYCODONE HYDROCHLORIDE 10 MG/1
10 TABLET ORAL EVERY 4 HOURS PRN
Start: 2018-01-01 | End: 2018-11-05

## 2018-01-01 RX ORDER — ONDANSETRON 4 MG/1
4 TABLET, FILM COATED ORAL
COMMUNITY
Start: 2018-01-01

## 2018-01-01 RX ORDER — SODIUM CHLORIDE 0.9 % (FLUSH) 0.9 %
3 SYRINGE (ML) INJECTION EVERY 12 HOURS SCHEDULED
Status: DISCONTINUED | OUTPATIENT
Start: 2018-01-01 | End: 2018-01-01 | Stop reason: HOSPADM

## 2018-01-01 RX ORDER — SODIUM POLYSTYRENE SULFONATE 15 G/60ML
15 SUSPENSION ORAL; RECTAL ONCE
Status: DISCONTINUED | OUTPATIENT
Start: 2018-01-01 | End: 2018-01-01

## 2018-01-01 RX ORDER — DEXTROSE MONOHYDRATE 50 MG/ML
250 INJECTION, SOLUTION INTRAVENOUS ONCE
Status: CANCELLED | OUTPATIENT
Start: 2018-01-01

## 2018-01-01 RX ORDER — CALCITRIOL 0.25 UG/1
0.25 CAPSULE, LIQUID FILLED ORAL DAILY
Start: 2018-01-01

## 2018-01-01 RX ORDER — ONDANSETRON 4 MG/1
4 TABLET, FILM COATED ORAL EVERY 8 HOURS PRN
COMMUNITY
End: 2018-01-01 | Stop reason: SDUPTHER

## 2018-01-01 RX ORDER — DIPHENHYDRAMINE HYDROCHLORIDE 50 MG/ML
50 INJECTION INTRAMUSCULAR; INTRAVENOUS AS NEEDED
Status: DISCONTINUED | OUTPATIENT
Start: 2018-01-01 | End: 2018-01-01 | Stop reason: HOSPADM

## 2018-01-01 RX ORDER — CALCIUM GLUCONATE 94 MG/ML
1 INJECTION, SOLUTION INTRAVENOUS ONCE
Status: COMPLETED | OUTPATIENT
Start: 2018-01-01 | End: 2018-01-01

## 2018-01-01 RX ORDER — IBUPROFEN 200 MG
1250 CAPSULE ORAL DAILY
Status: DISCONTINUED | OUTPATIENT
Start: 2018-01-01 | End: 2018-01-01 | Stop reason: SDUPTHER

## 2018-01-01 RX ORDER — MEPERIDINE HYDROCHLORIDE 50 MG/ML
25 INJECTION INTRAMUSCULAR; INTRAVENOUS; SUBCUTANEOUS
Status: CANCELLED | OUTPATIENT
Start: 2018-01-01 | End: 2018-01-01

## 2018-01-01 RX ORDER — FAMOTIDINE 10 MG/ML
20 INJECTION, SOLUTION INTRAVENOUS AS NEEDED
Status: CANCELLED | OUTPATIENT
Start: 2018-01-01

## 2018-01-01 RX ORDER — SODIUM POLYSTYRENE SULFONATE 15 G/60ML
15 SUSPENSION ORAL; RECTAL ONCE
Status: DISCONTINUED | OUTPATIENT
Start: 2018-01-01 | End: 2018-01-01 | Stop reason: HOSPADM

## 2018-01-01 RX ORDER — FENTANYL 12 UG/H
1 PATCH TRANSDERMAL
Status: DISCONTINUED | OUTPATIENT
Start: 2018-01-01 | End: 2018-01-01 | Stop reason: HOSPADM

## 2018-01-01 RX ORDER — HYDROMORPHONE HCL 110MG/55ML
0.25 PATIENT CONTROLLED ANALGESIA SYRINGE INTRAVENOUS EVERY 5 MIN PRN
Status: DISCONTINUED | OUTPATIENT
Start: 2018-01-01 | End: 2018-01-01 | Stop reason: HOSPADM

## 2018-01-01 RX ORDER — OXYCODONE HYDROCHLORIDE AND ACETAMINOPHEN 5; 325 MG/1; MG/1
1 TABLET ORAL EVERY 6 HOURS PRN
Qty: 100 TABLET | Refills: 0 | Status: SHIPPED | OUTPATIENT
Start: 2018-01-01 | End: 2018-01-01

## 2018-01-01 RX ORDER — DIPHENHYDRAMINE HYDROCHLORIDE 50 MG/ML
12.5 INJECTION INTRAMUSCULAR; INTRAVENOUS
Status: DISCONTINUED | OUTPATIENT
Start: 2018-01-01 | End: 2018-01-01 | Stop reason: HOSPADM

## 2018-01-01 RX ORDER — CALCITRIOL 0.25 UG/1
0.25 CAPSULE, LIQUID FILLED ORAL DAILY
Status: DISCONTINUED | OUTPATIENT
Start: 2018-01-01 | End: 2018-01-01 | Stop reason: HOSPADM

## 2018-01-01 RX ORDER — SODIUM CHLORIDE, SODIUM LACTATE, POTASSIUM CHLORIDE, CALCIUM CHLORIDE 600; 310; 30; 20 MG/100ML; MG/100ML; MG/100ML; MG/100ML
INJECTION, SOLUTION INTRAVENOUS CONTINUOUS
Status: DISCONTINUED | OUTPATIENT
Start: 2018-01-01 | End: 2018-01-01 | Stop reason: HOSPADM

## 2018-01-01 RX ORDER — FENTANYL 12 UG/H
1 PATCH TRANSDERMAL
Qty: 3 PATCH | Refills: 0
Start: 2018-01-01 | End: 2018-11-04

## 2018-01-01 RX ORDER — CEFAZOLIN SODIUM 1 G/3ML
INJECTION, POWDER, FOR SOLUTION INTRAMUSCULAR; INTRAVENOUS PRN
Status: DISCONTINUED | OUTPATIENT
Start: 2018-01-01 | End: 2018-01-01 | Stop reason: SDUPTHER

## 2018-01-01 RX ORDER — SODIUM POLYSTYRENE SULFONATE 15 G/60ML
15 SUSPENSION ORAL; RECTAL ONCE
Status: COMPLETED | OUTPATIENT
Start: 2018-01-01 | End: 2018-01-01

## 2018-01-01 RX ORDER — PALONOSETRON 0.05 MG/ML
0.25 INJECTION, SOLUTION INTRAVENOUS ONCE
Status: CANCELLED | OUTPATIENT
Start: 2018-01-01

## 2018-01-01 RX ORDER — MORPHINE SULFATE 10 MG/ML
4 INJECTION, SOLUTION INTRAMUSCULAR; INTRAVENOUS EVERY 5 MIN PRN
Status: DISCONTINUED | OUTPATIENT
Start: 2018-01-01 | End: 2018-01-01 | Stop reason: HOSPADM

## 2018-01-01 RX ORDER — LABETALOL HYDROCHLORIDE 5 MG/ML
5 INJECTION, SOLUTION INTRAVENOUS EVERY 10 MIN PRN
Status: DISCONTINUED | OUTPATIENT
Start: 2018-01-01 | End: 2018-01-01 | Stop reason: HOSPADM

## 2018-01-01 RX ORDER — OXYCODONE HYDROCHLORIDE AND ACETAMINOPHEN 5; 325 MG/1; MG/1
1 TABLET ORAL
COMMUNITY
Start: 2018-01-01

## 2018-01-01 RX ORDER — SODIUM CHLORIDE 9 MG/ML
100 INJECTION, SOLUTION INTRAVENOUS CONTINUOUS
Status: DISCONTINUED | OUTPATIENT
Start: 2018-01-01 | End: 2018-01-01

## 2018-01-01 RX ORDER — FAMOTIDINE 10 MG/ML
20 INJECTION, SOLUTION INTRAVENOUS AS NEEDED
Status: DISCONTINUED | OUTPATIENT
Start: 2018-01-01 | End: 2018-01-01 | Stop reason: HOSPADM

## 2018-01-01 RX ORDER — ONDANSETRON 2 MG/ML
4 INJECTION INTRAMUSCULAR; INTRAVENOUS EVERY 6 HOURS PRN
Status: DISCONTINUED | OUTPATIENT
Start: 2018-01-01 | End: 2018-01-01 | Stop reason: HOSPADM

## 2018-01-01 RX ORDER — OXYCODONE AND ACETAMINOPHEN 10; 325 MG/1; MG/1
1 TABLET ORAL EVERY 6 HOURS PRN
Status: DISCONTINUED | OUTPATIENT
Start: 2018-01-01 | End: 2018-01-01

## 2018-01-01 RX ORDER — POTASSIUM CHLORIDE 750 MG/1
10 TABLET, FILM COATED, EXTENDED RELEASE ORAL
COMMUNITY
Start: 2018-01-01

## 2018-01-01 RX ADMIN — ONDANSETRON HYDROCHLORIDE 4 MG: 2 SOLUTION INTRAMUSCULAR; INTRAVENOUS at 00:08

## 2018-01-01 RX ADMIN — ONDANSETRON HYDROCHLORIDE 4 MG: 2 SOLUTION INTRAMUSCULAR; INTRAVENOUS at 05:45

## 2018-01-01 RX ADMIN — CALCITRIOL 0.25 MCG: 0.25 CAPSULE ORAL at 09:01

## 2018-01-01 RX ADMIN — CALCITRIOL 0.25 MCG: 0.25 CAPSULE ORAL at 08:25

## 2018-01-01 RX ADMIN — CALCIUM GLUCONATE 1 G: 98 INJECTION, SOLUTION INTRAVENOUS at 14:57

## 2018-01-01 RX ADMIN — Medication 500 UNITS: at 13:54

## 2018-01-01 RX ADMIN — INSULIN HUMAN 10 UNITS: 100 INJECTION, SOLUTION PARENTERAL at 10:36

## 2018-01-01 RX ADMIN — ALBUTEROL SULFATE 10 MG: 2.5 SOLUTION RESPIRATORY (INHALATION) at 14:57

## 2018-01-01 RX ADMIN — OXYCODONE HYDROCHLORIDE AND ACETAMINOPHEN 1 TABLET: 10; 325 TABLET ORAL at 10:02

## 2018-01-01 RX ADMIN — OXYCODONE HYDROCHLORIDE AND ACETAMINOPHEN 1 TABLET: 10; 325 TABLET ORAL at 05:25

## 2018-01-01 RX ADMIN — Medication 3 ML: at 21:16

## 2018-01-01 RX ADMIN — OXYCODONE HYDROCHLORIDE AND ACETAMINOPHEN 1 TABLET: 10; 325 TABLET ORAL at 19:40

## 2018-01-01 RX ADMIN — OXYCODONE HYDROCHLORIDE AND ACETAMINOPHEN 1 TABLET: 10; 325 TABLET ORAL at 06:03

## 2018-01-01 RX ADMIN — SODIUM BICARBONATE: 84 INJECTION INTRAVENOUS at 23:14

## 2018-01-01 RX ADMIN — DEXTROSE MONOHYDRATE 250 ML: 50 INJECTION, SOLUTION INTRAVENOUS at 12:00

## 2018-01-01 RX ADMIN — ONDANSETRON HYDROCHLORIDE 4 MG: 2 SOLUTION INTRAMUSCULAR; INTRAVENOUS at 18:31

## 2018-01-01 RX ADMIN — ONDANSETRON HYDROCHLORIDE 4 MG: 2 SOLUTION INTRAMUSCULAR; INTRAVENOUS at 12:36

## 2018-01-01 RX ADMIN — DEXTROSE MONOHYDRATE 50 ML: 25 INJECTION, SOLUTION INTRAVENOUS at 10:36

## 2018-01-01 RX ADMIN — SODIUM BICARBONATE: 84 INJECTION INTRAVENOUS at 01:42

## 2018-01-01 RX ADMIN — PROCHLORPERAZINE MALEATE 10 MG: 10 TABLET, FILM COATED ORAL at 09:01

## 2018-01-01 RX ADMIN — ONDANSETRON HYDROCHLORIDE 4 MG: 2 SOLUTION INTRAMUSCULAR; INTRAVENOUS at 05:25

## 2018-01-01 RX ADMIN — CALCIUM GLUCONATE 1 G: 98 INJECTION, SOLUTION INTRAVENOUS at 20:42

## 2018-01-01 RX ADMIN — FLUOROURACIL 2720 MG: 50 INJECTION, SOLUTION INTRAVENOUS at 15:06

## 2018-01-01 RX ADMIN — INSULIN HUMAN 10 UNITS: 100 INJECTION, SOLUTION PARENTERAL at 14:57

## 2018-01-01 RX ADMIN — SODIUM BICARBONATE: 84 INJECTION INTRAVENOUS at 12:36

## 2018-01-01 RX ADMIN — DEXAMETHASONE SODIUM PHOSPHATE 12 MG: 10 INJECTION, SOLUTION INTRAMUSCULAR; INTRAVENOUS at 12:06

## 2018-01-01 RX ADMIN — FLUDEOXYGLUCOSE F18 1 DOSE: 300 INJECTION INTRAVENOUS at 09:27

## 2018-01-01 RX ADMIN — OXYCODONE HYDROCHLORIDE AND ACETAMINOPHEN 1 TABLET: 10; 325 TABLET ORAL at 12:36

## 2018-01-01 RX ADMIN — CEFAZOLIN SODIUM 1000 MG: 1 INJECTION, POWDER, FOR SOLUTION INTRAMUSCULAR; INTRAVENOUS at 13:06

## 2018-01-01 RX ADMIN — POTASSIUM CHLORIDE 20 MEQ: 200 INJECTION, SOLUTION INTRAVENOUS at 12:01

## 2018-01-01 RX ADMIN — SODIUM CHLORIDE 100 ML/HR: 9 INJECTION, SOLUTION INTRAVENOUS at 02:09

## 2018-01-01 RX ADMIN — GADOBENATE DIMEGLUMINE 10 ML: 529 INJECTION, SOLUTION INTRAVENOUS at 13:44

## 2018-01-01 RX ADMIN — MIDAZOLAM HYDROCHLORIDE 2 MG: 1 INJECTION INTRAMUSCULAR; INTRAVENOUS at 13:05

## 2018-01-01 RX ADMIN — PANTOPRAZOLE SODIUM 40 MG: 40 TABLET, DELAYED RELEASE ORAL at 05:25

## 2018-01-01 RX ADMIN — SODIUM CHLORIDE 125 ML/HR: 9 INJECTION, SOLUTION INTRAVENOUS at 21:03

## 2018-01-01 RX ADMIN — FENTANYL CITRATE 50 MCG: 50 INJECTION, SOLUTION INTRAMUSCULAR; INTRAVENOUS at 13:05

## 2018-01-01 RX ADMIN — MAGNESIUM SULFATE HEPTAHYDRATE 2 G: 40 INJECTION, SOLUTION INTRAVENOUS at 10:05

## 2018-01-01 RX ADMIN — ONDANSETRON HYDROCHLORIDE 4 MG: 2 SOLUTION INTRAMUSCULAR; INTRAVENOUS at 06:03

## 2018-01-01 RX ADMIN — CALCIUM 1250 MG: 500 TABLET ORAL at 08:24

## 2018-01-01 RX ADMIN — MIDAZOLAM HYDROCHLORIDE 1 MG: 1 INJECTION INTRAMUSCULAR; INTRAVENOUS at 13:11

## 2018-01-01 RX ADMIN — OXYCODONE HYDROCHLORIDE AND ACETAMINOPHEN 1 TABLET: 10; 325 TABLET ORAL at 00:08

## 2018-01-01 RX ADMIN — PANTOPRAZOLE SODIUM 40 MG: 40 TABLET, DELAYED RELEASE ORAL at 06:03

## 2018-01-01 RX ADMIN — CALCIUM 1250 MG: 500 TABLET ORAL at 17:19

## 2018-01-01 RX ADMIN — SODIUM POLYSTYRENE SULFONATE 15 G: 15 SUSPENSION ORAL; RECTAL at 09:25

## 2018-01-01 RX ADMIN — CALCIUM 1250 MG: 500 TABLET ORAL at 09:01

## 2018-01-01 RX ADMIN — OXYCODONE HYDROCHLORIDE AND ACETAMINOPHEN 1 TABLET: 10; 325 TABLET ORAL at 05:46

## 2018-01-01 RX ADMIN — LEUCOVORIN CALCIUM 600 MG: 200 INJECTION, POWDER, LYOPHILIZED, FOR SOLUTION INTRAMUSCULAR; INTRAVENOUS at 12:33

## 2018-01-01 RX ADMIN — OXALIPLATIN 100 MG: 5 INJECTION, SOLUTION INTRAVENOUS at 12:33

## 2018-01-01 RX ADMIN — POTASSIUM CHLORIDE 20 MEQ: 200 INJECTION, SOLUTION INTRAVENOUS at 10:13

## 2018-01-01 RX ADMIN — SODIUM BICARBONATE: 84 INJECTION INTRAVENOUS at 16:17

## 2018-01-01 RX ADMIN — OXYCODONE HYDROCHLORIDE 10 MG: 5 TABLET ORAL at 10:29

## 2018-01-01 RX ADMIN — FLUOROURACIL 450 MG: 50 INJECTION, SOLUTION INTRAVENOUS at 14:56

## 2018-01-01 RX ADMIN — KETOROLAC TROMETHAMINE 30 MG: 30 INJECTION, SOLUTION INTRAMUSCULAR; INTRAVENOUS at 13:42

## 2018-01-01 RX ADMIN — ONDANSETRON HYDROCHLORIDE 4 MG: 2 SOLUTION INTRAMUSCULAR; INTRAVENOUS at 16:03

## 2018-01-01 RX ADMIN — ALBUTEROL SULFATE 10 MG: 2.5 SOLUTION RESPIRATORY (INHALATION) at 19:53

## 2018-01-01 RX ADMIN — PANTOPRAZOLE SODIUM 40 MG: 40 TABLET, DELAYED RELEASE ORAL at 05:36

## 2018-01-01 RX ADMIN — SODIUM CHLORIDE 250 ML: 9 INJECTION, SOLUTION INTRAVENOUS at 10:00

## 2018-01-01 RX ADMIN — CALCIUM GLUCONATE 1 G: 94 INJECTION, SOLUTION INTRAVENOUS at 10:36

## 2018-01-01 RX ADMIN — OXYCODONE HYDROCHLORIDE AND ACETAMINOPHEN 1 TABLET: 10; 325 TABLET ORAL at 14:44

## 2018-01-01 RX ADMIN — OXYCODONE HYDROCHLORIDE 10 MG: 5 TABLET ORAL at 09:01

## 2018-01-01 RX ADMIN — OXYCODONE HYDROCHLORIDE AND ACETAMINOPHEN 1 TABLET: 10; 325 TABLET ORAL at 13:02

## 2018-01-01 RX ADMIN — SODIUM BICARBONATE: 84 INJECTION INTRAVENOUS at 07:59

## 2018-01-01 RX ADMIN — PALONOSETRON HYDROCHLORIDE 0.25 MG: 0.25 INJECTION INTRAVENOUS at 12:03

## 2018-01-01 RX ADMIN — SODIUM CHLORIDE 1000 ML: 9 INJECTION, SOLUTION INTRAVENOUS at 19:10

## 2018-01-01 RX ADMIN — PROPOFOL 30 MG: 10 INJECTION, EMULSION INTRAVENOUS at 13:13

## 2018-01-01 RX ADMIN — SODIUM CHLORIDE, SODIUM LACTATE, POTASSIUM CHLORIDE, CALCIUM CHLORIDE: 600; 310; 30; 20 INJECTION, SOLUTION INTRAVENOUS at 12:47

## 2018-01-01 RX ADMIN — DEXTROSE MONOHYDRATE 50 ML: 25 INJECTION, SOLUTION INTRAVENOUS at 14:57

## 2018-01-01 RX ADMIN — FENTANYL 1 PATCH: 12 PATCH, EXTENDED RELEASE TRANSDERMAL at 08:24

## 2018-01-01 RX ADMIN — OXYCODONE HYDROCHLORIDE AND ACETAMINOPHEN 1 TABLET: 10; 325 TABLET ORAL at 23:10

## 2018-01-01 RX ADMIN — INSULIN HUMAN 10 UNITS: 100 INJECTION, SOLUTION PARENTERAL at 20:19

## 2018-01-01 RX ADMIN — DEXTROSE MONOHYDRATE 50 ML: 25 INJECTION, SOLUTION INTRAVENOUS at 20:19

## 2018-01-01 RX ADMIN — CALCITRIOL 0.25 MCG: 0.25 CAPSULE ORAL at 17:19

## 2018-01-01 RX ADMIN — Medication 3 ML: at 08:23

## 2018-01-01 RX ADMIN — OXYCODONE HYDROCHLORIDE AND ACETAMINOPHEN 1 TABLET: 10; 325 TABLET ORAL at 18:31

## 2018-01-01 ASSESSMENT — PAIN SCALES - GENERAL
PAINLEVEL_OUTOF10: 0
PAINLEVEL_OUTOF10: 0

## 2018-01-01 ASSESSMENT — PAIN DESCRIPTION - LOCATION
LOCATION: CHEST
LOCATION: CHEST

## 2018-01-01 ASSESSMENT — PAIN DESCRIPTION - DESCRIPTORS
DESCRIPTORS: ACHING
DESCRIPTORS: ACHING

## 2018-01-01 ASSESSMENT — PAIN DESCRIPTION - ONSET
ONSET: AWAKENED FROM SLEEP
ONSET: AWAKENED FROM SLEEP

## 2018-01-01 ASSESSMENT — LIFESTYLE VARIABLES: SMOKING_STATUS: 1

## 2018-01-01 ASSESSMENT — PAIN DESCRIPTION - ORIENTATION
ORIENTATION: LEFT
ORIENTATION: LEFT

## 2018-01-01 ASSESSMENT — PAIN DESCRIPTION - FREQUENCY
FREQUENCY: CONTINUOUS
FREQUENCY: CONTINUOUS

## 2018-10-01 PROBLEM — Z13.228 SCREENING FOR OTHER AND UNSPECIFIED ENDOCRINE, NUTRITIONAL, METABOLIC AND IMMUNITY DISORDERS: Status: ACTIVE | Noted: 2018-01-01

## 2018-10-01 PROBLEM — Z13.0 SCREENING FOR OTHER AND UNSPECIFIED ENDOCRINE, NUTRITIONAL, METABOLIC AND IMMUNITY DISORDERS: Status: ACTIVE | Noted: 2018-01-01

## 2018-10-01 PROBLEM — Z13.29 SCREENING FOR OTHER AND UNSPECIFIED ENDOCRINE, NUTRITIONAL, METABOLIC AND IMMUNITY DISORDERS: Status: ACTIVE | Noted: 2018-01-01

## 2018-10-03 PROBLEM — C78.7 COLON CANCER METASTASIZED TO LIVER (HCC): Status: ACTIVE | Noted: 2018-01-01

## 2018-10-03 PROBLEM — E87.6 HYPOKALEMIA: Status: ACTIVE | Noted: 2018-01-01

## 2018-10-03 PROBLEM — C18.9 COLON CANCER METASTASIZED TO LIVER (HCC): Status: ACTIVE | Noted: 2018-01-01

## 2018-10-03 NOTE — TELEPHONE ENCOUNTER
Received message from  Kristel she had received telephone call with [facility], from  lab Hematology Dept  CRITICAL POTASSIUM READING: K+ 2.4  This information was given to Dr Acosta to review and address.

## 2018-10-03 NOTE — PROGRESS NOTES
[facility]  HEMATOLOGY & ONCOLOGY        Subjective     VISIT DIAGNOSIS:   Encounter Diagnoses   Name Primary?   • Anemia, unspecified type Yes   • Malignant neoplasm of colon, unspecified part of colon (CMS/HCC)    • Hypokalemia        REASON FOR VISIT:     Chief Complaint   Patient presents with   • Lung Cancer   • Consult        HEMATOLOGY / ONCOLOGY HISTORY:   Oncology/Hematology History    Pt was living in [facility] and presented with abdominal pain, belching, and nausea on going for over a month. CT scan on 9/14/18 showed abnormal right lower quadrant mass likely involving the ceacum, innumerable hepatic masses all highly suspicious for metastatic.  Colonoscopy with biopsy on 9/25 of the colon showed invasive adenocarcinoma. She moved down to be closer to family and is here to discuss management.        Screening for other and unspecified endocrine, nutritional, metabolic and immunity disorders    8/17/2018 Initial Diagnosis     Screening for other and unspecified endocrine, nutritional, metabolic and immunity disorders         9/6/2018 Imaging     Ultrasound Abdomen         9/14/2018 Imaging     CT Abdomen & Pelvis: Abnormal appearance of the Gi tract in the right lower quadrant, with a mass likely involving the cecum, associated with adenopathy.  The appearance is suspicious for malignancy and innumerable hepatic masses are consistent with diffuse metastatic disease.  Endoscopy is recommend for further evaluation.  Evidence of mild left adrenal hypertrophy.  Small bilateral low radiodense adrenal masses are noted, possibly adenomata as opposed to metastatic nodules.           9/25/2018 Surgery     Colonoscopy: Large cecal mass consistent with malignancy, biopsies taken.         9/26/2018 Biopsy     Cecum Biopsy: Invasive adenocarcinoma           Colon cancer metastasized to liver (CMS/HCC)    10/3/2018 Initial Diagnosis     Colon cancer metastasized to liver (CMS/HCC)        Chemotherapy  Treatment History     Treatment Plan: IP COLORECTAL [id number] (OXALIplatin / Leucovorin / Fluorouracil CIV)    Medications: fluorouracil (ADRUCIL) chemo injection 600 mg, 400 mg/m2, 0 of 12 cycles    fluorouracil (ADRUCIL) 1,810 mg in sodium chloride 0.9 % 286.2 mL chemo IVPB - FOR IP USE, 1,200 mg/m2, 0 of 12 cycles    leucovorin 600 mg in dextrose (D5W) 5 % 310 mL IVPB, 400 mg/m2, 0 of 12 cycles    OXALIplatin (ELOXATIN) 130 mg in dextrose (D5W) 5 % 276 mL chemo IVPB, 85 mg/m2, 0 of 12 cycles      Reason treatment was stopped:  [Plan is still active]     Cancer Staging Information:  Cancer Staging  No matching staging information was found for the patient.      INTERVAL HISTORY  Patient ID: Deb Salinas is a 63 y.o. year old female Cancer Staging  No matching staging information was found for the patient.        Review of Systems   Constitutional: Positive for activity change, appetite change, fatigue and unexpected weight change.   HENT: Negative.    Eyes: Negative.    Respiratory: Negative.    Cardiovascular: Negative for chest pain and palpitations.   Gastrointestinal: Positive for abdominal distention, abdominal pain, constipation and nausea. Negative for blood in stool, diarrhea and vomiting.   Endocrine: Negative.    Genitourinary: Negative.    Musculoskeletal: Negative.    Skin: Negative.    Neurological: Positive for dizziness.   Hematological: Negative.    Psychiatric/Behavioral: Negative.             Medications:    Current Outpatient Prescriptions   Medication Sig Dispense Refill   • ALBUTEROL IN Inhale 90 mcg Every 4 (Four) Hours.     • ondansetron (ZOFRAN) 4 MG tablet Take 4 mg by mouth Every 8 (Eight) Hours As Needed for Nausea or Vomiting.     • bisacodyl (DULCOLAX) 5 MG EC tablet Take 1 tablet by mouth Daily. 60 tablet 4   • oxyCODONE-acetaminophen (PERCOCET) 5-325 MG per tablet Take 1 tablet by mouth Every 6 (Six) Hours As Needed for Moderate Pain . Pt has abdominal pain 2/2 metastatic colon  cancer 100 tablet 0   • potassium chloride (K-DUR) 10 MEQ CR tablet Take 1 tablet by mouth 2 (Two) Times a Day With Meals. 90 tablet 3     No current facility-administered medications for this visit.        ALLERGIES:    Allergies   Allergen Reactions   • Penicillin V Potassium Unknown (See Comments)       Objective      Vitals:    10/03/18 0804   BP: 108/62   Pulse: 96   Resp: 16   Temp: 98.4 °F (36.9 °C)   SpO2: 90%       Current Status 10/3/2018   ECOG score 0       General Appearance: Cachetic.Pale. A little jaundiced. Patient is awake, alert, oriented and in no acute distress. Patient is welldeveloped, wellnourished, and appears stated age.  HEENT: Normocephalic. , extraocular movements intact, pupils, round, reactive to light and  accommodation. Mouth and throat are clear with moist oral mucosa.  NECK: Supple, no jugular venous distention, thyroid not enlarged.  LYMPH: No cervical, supraclavicular, axillary, or inguinal lymphadenopathy.  CHEST: Equal bilateral expansion, AP  diameter normal, resonant percussion note  LUNGS: Good air movement, no rales, rhonchi, rubs or wheezes with auscultation  CARDIO: Regular sinus rhythm, no murmurs, gallops or rubs.  ABDOMEN: distended, diffuse tenderness , no guarding, no rebound, No hepatosplenomegaly. No abdominal masses. Bowel sounds positive. No hernia  GENITALIA: Not examined.  BREASTS: Not examined.  MUSKEL: No joint swelling, decreased motion, or inflammation  EXTREMS: ++ edema of baljeet LE, No clubbing, cyanosis, No varicose veins.  NEURO: Grossly nonfocal, Gait is coordinated and smooth, Cognition is preserved.  SKIN: No rashes, no ecchymoses, no petechia.  PSYCH: Oriented to time, place and person. Memory is preserved. Mood and affect appear normal      RECENT LABS:  Consult on 10/03/2018   Component Date Value Ref Range Status   • Iron 10/03/2018 43  42 - 180 mcg/dL Final   • TIBC 10/03/2018 144* 225 - 420 mcg/dL Final   • Iron Saturation 10/03/2018 30  20 - 45 %  Final   • Ferritin 10/03/2018 980.00* 11.10 - 264.00 ng/mL Final   • Folate 10/03/2018 5.61  >2.75 ng/mL Final   • Vitamin B-12 10/03/2018 726  239 - 931 pg/mL Final   • Magnesium 10/03/2018 1.8  1.4 - 2.2 mg/dL Final   • Ursodeoxycholate 10/03/2018 0.70  umol/L Final    Reference Range:  All Ages: <1.9   • Cholate 10/03/2018 3.0* umol/L Final    Reference Range:  All Ages: <2.2   • Chenodeoxycholate 10/03/2018 8.1* umol/L Final    Reference Range:  All Ages: <5.8   • Deoxycholate 10/03/2018 0.50  umol/L Final    Reference Range:  All Ages: <3.3   • Bile Acids Total 10/03/2018 12* umol/L Final    Reference Range:  All Ages: <9.2   Appointment on 10/03/2018   Component Date Value Ref Range Status   • Extra Tube 10/03/2018 Hold for add-ons.   Final    Auto resulted.   Orders Only on 10/02/2018   Component Date Value Ref Range Status   • Glucose 10/03/2018 90  70 - 100 mg/dL Final   • BUN 10/03/2018 9  5 - 21 mg/dL Final   • Creatinine 10/03/2018 0.94  0.50 - 1.40 mg/dL Final   • Sodium 10/03/2018 135  135 - 145 mmol/L Final   • Potassium 10/03/2018 2.4* 3.5 - 5.3 mmol/L Final   • Chloride 10/03/2018 88* 98 - 110 mmol/L Final   • CO2 10/03/2018 37.0* 24.0 - 31.0 mmol/L Final   • Calcium 10/03/2018 7.8* 8.4 - 10.4 mg/dL Final   • Total Protein 10/03/2018 6.2* 6.3 - 8.7 g/dL Final   • Albumin 10/03/2018 3.20* 3.50 - 5.00 g/dL Final   • ALT (SGPT) 10/03/2018 47  0 - 54 U/L Final   • AST (SGOT) 10/03/2018 244* 7 - 45 U/L Final   • Alkaline Phosphatase 10/03/2018 436* 24 - 120 U/L Final   • Total Bilirubin 10/03/2018 2.0* 0.1 - 1.0 mg/dL Final   • eGFR Non African Amer 10/03/2018 60* >60 mL/min/1.73 Final   • Globulin 10/03/2018 3.0  gm/dL Final   • A/G Ratio 10/03/2018 1.1  1.1 - 2.5 g/dL Final   • BUN/Creatinine Ratio 10/03/2018 9.6  7.0 - 25.0 Final   • Anion Gap 10/03/2018 10.0  4.0 - 13.0 mmol/L Final   • WBC 10/03/2018 11.90* 4.80 - 10.80 10*3/mm3 Final   • RBC 10/03/2018 3.55* 4.20 - 5.40 10*6/mm3 Final   •  Hemoglobin 10/03/2018 10.8* 12.0 - 16.0 g/dL Final   • Hematocrit 10/03/2018 32.6* 37.0 - 47.0 % Final   • MCV 10/03/2018 91.8  82.0 - 98.0 fL Final   • MCH 10/03/2018 30.4  28.0 - 32.0 pg Final   • MCHC 10/03/2018 33.1  33.0 - 36.0 g/dL Final   • RDW 10/03/2018 18.2* 12.0 - 15.0 % Final   • RDW-SD 10/03/2018 59.7* 40.0 - 54.0 fl Final   • MPV 10/03/2018 9.3  6.0 - 12.0 fL Final   • Platelets 10/03/2018 521* 130 - 400 10*3/mm3 Final   • Neutrophil % 10/03/2018 77.3  39.0 - 78.0 % Final   • Lymphocyte % 10/03/2018 12.4* 15.0 - 45.0 % Final   • Monocyte % 10/03/2018 8.1  4.0 - 12.0 % Final   • Eosinophil % 10/03/2018 0.8  0.0 - 4.0 % Final   • Basophil % 10/03/2018 0.6  0.0 - 2.0 % Final   • Immature Grans % 10/03/2018 0.8  0.0 - 5.0 % Final   • Neutrophils, Absolute 10/03/2018 9.21* 1.87 - 8.40 10*3/mm3 Final   • Lymphocytes, Absolute 10/03/2018 1.47  0.72 - 4.86 10*3/mm3 Final   • Monocytes, Absolute 10/03/2018 0.96  0.19 - 1.30 10*3/mm3 Final   • Eosinophils, Absolute 10/03/2018 0.10  0.00 - 0.70 10*3/mm3 Final   • Basophils, Absolute 10/03/2018 0.07  0.00 - 0.20 10*3/mm3 Final   • Immature Grans, Absolute 10/03/2018 0.09* 0.00 - 0.03 10*3/mm3 Final   • nRBC 10/03/2018 0.0  0.0 - 0.0 /100 WBC Final       RADIOLOGY:  Mri Brain With & Without Contrast    Result Date: 10/5/2018  Narrative: HISTORY: Metastatic colon cancer  MRI BRAIN: Multiplanar imaging the brain is performed with and without contrast.  COMPARISON: No prior studies against institution for comparison  FINDINGS: There is no diffusion restriction to indicate acute ischemia. There is moderate cerebellar mild cerebral atrophy. There is no sellar mass. The corpus callosum appears intact. There are diffuse nonenhancing hyperintense FLAIR signal changes seen throughout the periventricular white matter regions. Findings might represent chronic small vessel ischemia, however demyelinating process also considered.. There is no abnormal intracranial enhancement.  No evidence of intracranial metastasis. There is no abnormal extra-axial fluid collection.  Limited assessment of the orbits and base of skull is unremarkable. Normal flow void seen in the distal internal carotid and basilar arteries.      Impression: 1. Nonenhancing hyperintense FLAIR signal changes within the periventricular white matter regions may be due to chronic vessel vessel ischemia. Demyelinating process also considered. 2. No abnormal intracranial enhancement. No evidence of intracranial metastasis. 3. No diffusion restriction indicate acute ischemia. No intracranial hemorrhage. This report was finalized on 10/05/2018 15:24 by Dr. Karolina Szymanski MD.           Assessment/Plan colon ca metastatic to the liver. Discussed indetail with pt and daughter her stage, and options. Best supportive care vs systemic therapy. She chose systemic therapy. Reviewed various systemic option, will get NGS to direct treatment. However we will plan to start with FOLFOX and modify as result becomes available. Pros and cons d/w them.  I had all their questions answered to my best ability  -will get MRI to complete staging. If scan >a month will get a  Baseline T c/a/p before starting tx  -port to be placed  -nutrition consult    1.MetCRC: plan as above    2. FEN: will give 2g magnesium and 40 meq IV kcl today. K low and pt is nauseous. rx for kcl orally   --she has zofran    3.Abdominal pain: rx for percocet    4. Prophylaxis: dulcolax for constipation  5. Anemia: check iron profile, B12, folate       Time Spent: 60 minutes; greater than  50% of time was spent in patient counseling and care coordination.     Daphney Acosta MD    10/3/2018    10:10 AM

## 2018-10-08 PROBLEM — C18.9 MALIGNANT NEOPLASM OF COLON (HCC): Status: ACTIVE | Noted: 2018-01-01

## 2018-10-08 NOTE — TELEPHONE ENCOUNTER
Received call from Nelda stating that her mom is getting her port on Thursday the 11th.  Discussed with Nelda that her moms treatment can be started either Monday, Tuesday, or Wednesday.  Patient wishes to start treatment on 10/16/18.  Requested refill of zofran.  Nelda stated that her mom's legs are very swollen and she is watching he salt intake.  Discussed with Nelda that her mom needs to keep her legs elevated as much as possible and to increase her fluid intake.  Notified Nelda of appointment time of 0900 on the 16 th.

## 2018-10-11 NOTE — ANESTHESIA PRE PROCEDURE
BP Readings from Last 3 Encounters:   10/11/18 106/65       NPO Status: Time of last liquid consumption: 2200                        Time of last solid consumption: 2200                        Date of last liquid consumption: 10/10/18                        Date of last solid food consumption: 10/10/18    BMI:   Wt Readings from Last 3 Encounters:   10/11/18 113 lb (51.3 kg)   10/08/18 113 lb 1.6 oz (51.3 kg)     Body mass index is 20.34 kg/m². CBC: No results found for: WBC, RBC, HGB, HCT, MCV, RDW, PLT    CMP: No results found for: NA, K, CL, CO2, BUN, CREATININE, GFRAA, AGRATIO, LABGLOM, GLUCOSE, PROT, CALCIUM, BILITOT, ALKPHOS, AST, ALT    POC Tests: No results for input(s): POCGLU, POCNA, POCK, POCCL, POCBUN, POCHEMO, POCHCT in the last 72 hours. Coags: No results found for: PROTIME, INR, APTT    HCG (If Applicable): No results found for: PREGTESTUR, PREGSERUM, HCG, HCGQUANT     ABGs: No results found for: PHART, PO2ART, HCZ2NVI, ARY9ERO, BEART, U7QEJURA     Type & Screen (If Applicable):  No results found for: LABABO, [address]    Anesthesia Evaluation  Patient summary reviewed and Nursing notes reviewed no history of anesthetic complications:   Airway: Mallampati: II  TM distance: >3 FB   Neck ROM: full  Mouth opening: > = 3 FB Dental:      Comment: The pink marked teeth are the only teeth that the pt has, they are not loose    Pulmonary:normal exam    (+) current smoker                           Cardiovascular:Negative CV ROS             Beta Blocker:  Not on Beta Blocker         Neuro/Psych:   Negative Neuro/Psych ROS              GI/Hepatic/Renal: Neg GI/Hepatic/Renal ROS            Endo/Other:    (+) malignancy/cancer. Pt had PAT visit. Abdominal:           Vascular: negative vascular ROS. Anesthesia Plan      MAC     ASA 3       Induction: intravenous.     MIPS: Postoperative opioids intended and Prophylactic antiemetics

## 2018-10-11 NOTE — ANESTHESIA POSTPROCEDURE EVALUATION
Department of Anesthesiology  Postprocedure Note    Patient: Rangel Chow  MRN: [id number]  YOB: 1955  Date of evaluation: 10/11/2018  Time:  1:48 PM     Procedure Summary     Date:  10/11/18 Room / Location:  [facility] OR  / [facility] OR    Anesthesia Start:  5809 Anesthesia Stop:  6856    Procedure:  SINGLE LUMEN PORT PLACEMENT (Left Chest) Diagnosis:  (COLON CANCER)    Surgeon:  Elvin Liriano MD Responsible Provider:  NINFA Decker CRNA    Anesthesia Type:  MAC ASA Status:  3          Anesthesia Type: MAC    Remedios Phase I:      Remedios Phase II:      Last vitals: Reviewed and per EMR flowsheets.        Anesthesia Post Evaluation    Patient location during evaluation: bedside  Patient participation: complete - patient participated  Level of consciousness: awake  Pain score: 0  Airway patency: patent  Nausea & Vomiting: no vomiting and no nausea  Complications: no  Cardiovascular status: hemodynamically stable  Respiratory status: acceptable  Hydration status: stable

## 2018-10-16 NOTE — PROGRESS NOTES
[facility]  HEMATOLOGY & ONCOLOGY    Cancer Staging Information:  Cancer Staging  No matching staging information was found for the patient.      Subjective     VISIT DIAGNOSIS:   Encounter Diagnosis   Name Primary?   • Malignant neoplasm of descending colon (CMS/HCC)        REASON FOR VISIT:     Chief Complaint   Patient presents with   • Follow-up     She is here to review her scans and discuss txt plans        HEMATOLOGY / ONCOLOGY HISTORY:   Oncology/Hematology History    Pt was living in [facility] and presented with abdominal pain, belching, and nausea on going for over a month. CT scan on 9/14/18 showed abnormal right lower quadrant mass likely involving the ceacum, innumerable hepatic masses all highly suspicious for metastatic.  Colonoscopy with biopsy on 9/25 of the colon showed invasive adenocarcinoma. She moved down to be closer to family and is here to discuss management.        Screening for other and unspecified endocrine, nutritional, metabolic and immunity disorders    8/17/2018 Initial Diagnosis     Screening for other and unspecified endocrine, nutritional, metabolic and immunity disorders         9/6/2018 Imaging     Ultrasound Abdomen         9/14/2018 Imaging     CT Abdomen & Pelvis: Abnormal appearance of the Gi tract in the right lower quadrant, with a mass likely involving the cecum, associated with adenopathy.  The appearance is suspicious for malignancy and innumerable hepatic masses are consistent with diffuse metastatic disease.  Endoscopy is recommend for further evaluation.  Evidence of mild left adrenal hypertrophy.  Small bilateral low radiodense adrenal masses are noted, possibly adenomata as opposed to metastatic nodules.           9/25/2018 Surgery     Colonoscopy: Large cecal mass consistent with malignancy, biopsies taken.         9/26/2018 Biopsy     Cecum Biopsy: Invasive adenocarcinoma           Colon cancer metastasized to liver (CMS/HCC)    10/3/2018  Initial Diagnosis     Colon cancer metastasized to liver (CMS/HCC)              INTERVAL HISTORY  Patient ID: Deb Salinas is a 63 y.o. year old female with metCRC here to review staging imaging and start tx with FOLFOX. PET showed knoe righ cecal mass, multiple hepatic lesions and a questionable right subcarinal nodule. Follow-up recommended. Brain MRI negative for parenchymal metastasis.  She complains of bilateral LE swelling, belching, nausea and diffuse abdominal tightness/tenderness. She has bowel movement but they are short droppings. She felt that appetite is a little better    Past Medical History:   Past Medical History:   Diagnosis Date   • Colon cancer metastasized to liver (CMS/HCC) 10/3/2018   • Hyperlipidemia    • Liver mass    • Malignant neoplasm of colon (CMS/HCC) 10/8/2018     Past Surgical History:   Past Surgical History:   Procedure Laterality Date   • BREAST BIOPSY Right    •  SECTION      x3   • COLONOSCOPY  2000   • COLONOSCOPY      Dr. Bridger cooper - HX of villous adenoma   • COLPOSCOPY      dimitrios 1 & 2   • GANGLION CYST EXCISION Left    • HERNIA REPAIR Right 2005    inguinal   • LAPAROSCOPIC TUBAL LIGATION     • PORTACATH PLACEMENT      placed 10/11/18    • TONSILLECTOMY       Social History:   Social History     Social History   • Marital status: Single     Spouse name: N/A   • Number of children: N/A   • Years of education: N/A     Occupational History   • Not on file.     Social History Main Topics   • Smoking status: Current Every Day Smoker     Packs/day: 0.25     Years: 47.00     Types: Cigarettes   • Smokeless tobacco: Never Used   • Alcohol use No   • Drug use: No   • Sexual activity: Defer     Other Topics Concern   • Not on file     Social History Narrative   • No narrative on file     Family History: History reviewed. No pertinent family history.    Review of Systems   Constitutional: Positive for activity change, appetite change, fatigue and unexpected  "weight change.   HENT: Negative.    Eyes: Negative.    Respiratory: Negative.    Cardiovascular: Positive for leg swelling. Negative for chest pain and palpitations.   Gastrointestinal: Positive for abdominal distention, abdominal pain, constipation and nausea. Negative for diarrhea and vomiting.   Endocrine: Negative.    Genitourinary: Negative.    Musculoskeletal: Negative.    Skin: Negative.    Neurological: Negative.    Hematological: Negative.    Psychiatric/Behavioral: Negative.         Performance Status:  Symptomatic; fully ambulatory    Medications:    Current Outpatient Prescriptions   Medication Sig Dispense Refill   • ALBUTEROL IN Inhale 90 mcg Every 4 (Four) Hours.     • bisacodyl (DULCOLAX) 5 MG EC tablet Take 1 tablet by mouth Daily. 60 tablet 4   • ondansetron (ZOFRAN) 4 MG tablet Take 1 tablet by mouth Every 8 (Eight) Hours As Needed for Nausea or Vomiting. 30 tablet 5   • oxyCODONE-acetaminophen (PERCOCET) 5-325 MG per tablet Take 1 tablet by mouth Every 6 (Six) Hours As Needed for Moderate Pain . Pt has abdominal pain 2/2 metastatic colon cancer 100 tablet 0   • potassium chloride (K-DUR) 10 MEQ CR tablet Take 1 tablet by mouth 2 (Two) Times a Day With Meals. 90 tablet 3     No current facility-administered medications for this visit.        ALLERGIES:    Allergies   Allergen Reactions   • Penicillin V Potassium Unknown (See Comments)       Objective      Vitals:    10/16/18 0854   BP: 122/74   Pulse: 105   Resp: 18   Temp: 97.3 °F (36.3 °C)   TempSrc: Tympanic   SpO2: 94%   Weight: 55.6 kg (122 lb 9.6 oz)   Height: 158.8 cm (62.52\")   PainSc:   6   PainLoc: Abdomen         Current Status 10/16/2018   ECOG score 2         Physical Exam  General Appearance: Patient is awake, alert, oriented and in no acute distress. Patient is welldeveloped, wellnourished, and appears stated age.  HEENT: Normocephalic. Sclerae clear, conjunctiva pink, extraocular movements intact, pupils, round, reactive to light " and  accommodation. Mouth and throat are clear with moist oral mucosa.  NECK: Supple, no jugular venous distention, thyroid not enlarged.  LYMPH: No cervical, supraclavicular, axillary, or inguinal lymphadenopathy.  CHEST: Equal bilateral expansion, AP  diameter normal, resonant percussion note  LUNGS: Good air movement, no rales, rhonchi, rubs or wheezes with auscultation  CARDIO: Regular sinus rhythm, no murmurs, gallops or rubs.  ABDOMEN: distended,  No tenderness, no guarding, no rebound, No hepatosplenomegaly. No abdominal masses. Bowel sounds positive but diminished. No hernia  GENITALIA: Not examined.  BREASTS: Not examined.  MUSKEL: No joint swelling, decreased motion, or inflammation  EXTREMS: baljeet LE edema, no clubbing,no cyanosis, No varicose veins.  NEURO: Grossly nonfocal, Cognition is preserved.  SKIN: No rashes, no ecchymoses, no petechia.  PSYCH: Oriented to time, place and person. Memory is preserved. Mood and affect appear normal  RECENT LABS:  No visits with results within 7 Day(s) from this visit.   Latest known visit with results is:   Consult on 10/03/2018   Component Date Value Ref Range Status   • Iron 10/03/2018 43  42 - 180 mcg/dL Final   • TIBC 10/03/2018 144* 225 - 420 mcg/dL Final   • Iron Saturation 10/03/2018 30  20 - 45 % Final   • Ferritin 10/03/2018 980.00* 11.10 - 264.00 ng/mL Final   • Folate 10/03/2018 5.61  >2.75 ng/mL Final   • Vitamin B-12 10/03/2018 726  239 - 931 pg/mL Final   • Magnesium 10/03/2018 1.8  1.4 - 2.2 mg/dL Final   • Ursodeoxycholate 10/03/2018 0.70  umol/L Final    Reference Range:  All Ages: <1.9   • Cholate 10/03/2018 3.0* umol/L Final    Reference Range:  All Ages: <2.2   • Chenodeoxycholate 10/03/2018 8.1* umol/L Final    Reference Range:  All Ages: <5.8   • Deoxycholate 10/03/2018 0.50  umol/L Final    Reference Range:  All Ages: <3.3   • Bile Acids Total 10/03/2018 12* umol/L Final    Reference Range:  All Ages: <9.2       RADIOLOGY:  Mri Brain With &  Without Contrast    Result Date: 10/5/2018  Narrative: HISTORY: Metastatic colon cancer  MRI BRAIN: Multiplanar imaging the brain is performed with and without contrast.  COMPARISON: No prior studies against institution for comparison  FINDINGS: There is no diffusion restriction to indicate acute ischemia. There is moderate cerebellar mild cerebral atrophy. There is no sellar mass. The corpus callosum appears intact. There are diffuse nonenhancing hyperintense FLAIR signal changes seen throughout the periventricular white matter regions. Findings might represent chronic small vessel ischemia, however demyelinating process also considered.. There is no abnormal intracranial enhancement. No evidence of intracranial metastasis. There is no abnormal extra-axial fluid collection.  Limited assessment of the orbits and base of skull is unremarkable. Normal flow void seen in the distal internal carotid and basilar arteries.      Impression: 1. Nonenhancing hyperintense FLAIR signal changes within the periventricular white matter regions may be due to chronic vessel vessel ischemia. Demyelinating process also considered. 2. No abnormal intracranial enhancement. No evidence of intracranial metastasis. 3. No diffusion restriction indicate acute ischemia. No intracranial hemorrhage. This report was finalized on 10/05/2018 15:24 by Dr. Karolina Szymanski MD.    Nm Pet Skull Base To Mid Thigh    Result Date: 10/8/2018  Narrative:  PET/CT with F-18 FDG CPT code:  88706 (tumor imaging, skull base to thighs)  INDICATION: Staging; inform initial treatment strategy  CLINICAL HISTORY: Colon cancer  COMPARISON STUDIES: None available.   SERUM GLUCOSE LEVEL: 68 mg/dL  RADIOPHARMACEUTICAL: F-18 FDG 12.52 mCi IV left antecubital fossa  Radiation dose equals  mGy-cm.  Automated exposure control dose reduction technique was implemented.   TECHNIQUE: The study was performed using in-line PET/CT from the skull base to the mid-thigh.   Cross-sectional as well as 3D (MIP) images of the PET data and cross-sectional images of the CT data were generated with co-registration of the 2 image sets.  The PET images are displayed with and without attenuation correction.  CT imaging was employed for attenuation correction and anatomic localization of PET scan abnormalities.  A diagnostic CT with contrast agents was not performed.  FINDINGS:  Head and neck: No abnormal FDG activity within the visualized brain, orbits, paranasal sinuses, pharyngeal soft tissues, and thyroid gland. No FDG-avid cervical lymph nodes.  Chest:  No FDG-avid pulmonary nodules or airspace opacities. Subcarinal lymph node showing maximum SUV of 4 is noted. However, there is adjacent calcification and therefore inflammatory process is not excluded.  . Abdomen: There is a mass in the right lower quadrant/pelvic inlet (image 46 3, series 2) associated with the cecum which shows focal hypermetabolic activity of maximum SUV of 6.8. Additionally, there is diffuse replacement of the liver with hypermetabolic masses, showing maximum SUV of up to 7.8.  Pelvis: Small amount of ascites.  Skeleton: No FDG-avid skeletal lesions.       Impression:  1.  Abnormal hypermetabolic mass in the right lower quadrant and associated with the cecum. This is likely the patient's primary tumor. 2.  Innumerable metastatic lesions throughout the liver. 3. Hypermetabolic subcarinal lymph node with adjacent calcification. Adjacent calcific density may reflect inflammatory process. However, involvement in the metastatic disease is not excluded. Short-term follow-up recommended with CT chest. Alternatively, comparison with prior imaging may be performed. This report was finalized on 10/08/2018 14:57 by Dr. Giuliana Delgadillo MD.           Assessment/Plan  Deb Salinas is a 63 y.o. 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    Patient Active Problem List   Diagnosis   •  Screening for other and unspecified endocrine, nutritional, metabolic and immunity disorders   • Hypokalemia   • Colon cancer metastasized to liver (CMS/HCC)   • Malignant neoplasm of colon (CMS/HCC)      1.MetCRC:   PET showed knoe righ cecal mass, multiple hepatic lesions and a questionable right subcarinal nodule. Follow-up recommended. Brain MRI negative for parenchymal metastasis.Port placed  --labs reviewed, kidney function worse. Suspect form pressure from the abdomen,  --refer for US guided paracentesis. Will re-eval tomorrow about getting tx.    2. FEN: potassium okay  --she has zofran for nausea.    3.Abdominal pain: rx for percocet    4. Prophylaxis: dulcolax for constipation  5. GERD: rx omeprazole to her pharmacy        Daphney Acosta MD    10/16/2018    9:16 AM

## 2018-10-17 PROBLEM — Z98.890 S/P ABDOMINAL PARACENTESIS: Status: ACTIVE | Noted: 2018-01-01

## 2018-10-17 NOTE — TELEPHONE ENCOUNTER
Left message for Nelda that Dr. Acosta wants her mom to get albumin after she has the paracentesis today and to come over to the cancer center when she is done.

## 2018-10-22 NOTE — PROGRESS NOTES
1145 Spoke w/Olinda, RN, with Dr Acosta, re: abnormal LFT's, renal functions.  aware of labs, pt wishes to try treatment. Notified pharmacy.

## 2018-10-22 NOTE — PROGRESS NOTES
[facility]  HEMATOLOGY & ONCOLOGY    Cancer Staging Information:  Cancer Staging  No matching staging information was found for the patient.      Subjective     VISIT DIAGNOSIS:   No diagnosis found.    REASON FOR VISIT:     No chief complaint on file.       HEMATOLOGY / ONCOLOGY HISTORY:   Oncology/Hematology History    Pt was living in [facility] and presented with abdominal pain, belching, and nausea on going for over a month. CT scan on 9/14/18 showed abnormal right lower quadrant mass likely involving the ceacum, innumerable hepatic masses all highly suspicious for metastatic.  Colonoscopy with biopsy on 9/25 of the colon showed invasive adenocarcinoma. She moved down to be closer to family and is here to discuss management.        Screening for other and unspecified endocrine, nutritional, metabolic and immunity disorders    8/17/2018 Initial Diagnosis     Screening for other and unspecified endocrine, nutritional, metabolic and immunity disorders         9/6/2018 Imaging     Ultrasound Abdomen         9/14/2018 Imaging     CT Abdomen & Pelvis: Abnormal appearance of the Gi tract in the right lower quadrant, with a mass likely involving the cecum, associated with adenopathy.  The appearance is suspicious for malignancy and innumerable hepatic masses are consistent with diffuse metastatic disease.  Endoscopy is recommend for further evaluation.  Evidence of mild left adrenal hypertrophy.  Small bilateral low radiodense adrenal masses are noted, possibly adenomata as opposed to metastatic nodules.           9/25/2018 Surgery     Colonoscopy: Large cecal mass consistent with malignancy, biopsies taken.         9/26/2018 Biopsy     Cecum Biopsy: Invasive adenocarcinoma           Colon cancer metastasized to liver (CMS/HCC)    10/3/2018 Initial Diagnosis     Colon cancer metastasized to liver (CMS/HCC)              INTERVAL HISTORY  Patient ID: Deb Salinas is a 63 y.o. year old female with  [facility] here to review staging imaging and start tx with FOLFOX. PET showed knoe righ cecal mass, multiple hepatic lesions and a questionable right subcarinal nodule. Follow-up recommended. Brain MRI negative for parenchymal metastasis.  She complains of bilateral LE swelling, belching, nausea and diffuse abdominal tightness/tenderness. She has bowel movement but they are short droppings. She felt that appetite is a little better  10/22/18: pain not controlled, abdomin very distented and tense, no appetite. Need help most of the time. No nausea, or vomitting. She discussed with her family and is willing to proceed with chemo. She has US abdo    Past Medical History:   Past Medical History:   Diagnosis Date   • Colon cancer metastasized to liver (CMS/HCC) 10/3/2018   • Hyperlipidemia    • Liver mass    • Malignant neoplasm of colon (CMS/HCC) 10/8/2018     Past Surgical History:   Past Surgical History:   Procedure Laterality Date   • BREAST BIOPSY Right    •  SECTION      x3   • COLONOSCOPY  2000   • COLONOSCOPY      Dr. Bridger cooper - HX of villous adenoma   • COLPOSCOPY      dimitrios 1 & 2   • GANGLION CYST EXCISION Left    • HERNIA REPAIR Right 2005    inguinal   • LAPAROSCOPIC TUBAL LIGATION     • PORTACATH PLACEMENT      placed 10/11/18    • TONSILLECTOMY       Social History:   Social History     Social History   • Marital status: Single     Spouse name: N/A   • Number of children: N/A   • Years of education: N/A     Occupational History   • Not on file.     Social History Main Topics   • Smoking status: Current Every Day Smoker     Packs/day: 0.25     Years: 47.00     Types: Cigarettes   • Smokeless tobacco: Never Used   • Alcohol use No   • Drug use: No   • Sexual activity: Defer     Other Topics Concern   • Not on file     Social History Narrative   • No narrative on file     Family History: History reviewed. No pertinent family history.    Review of Systems   Constitutional: Positive for activity  "change, appetite change, fatigue and unexpected weight change.   HENT: Negative.    Eyes: Negative.    Respiratory: Negative.    Cardiovascular: Positive for leg swelling. Negative for chest pain and palpitations.   Gastrointestinal: Positive for abdominal distention, abdominal pain, constipation and nausea. Negative for diarrhea and vomiting.   Endocrine: Negative.    Genitourinary: Negative.    Musculoskeletal: Negative.    Skin: Negative.    Neurological: Negative.    Hematological: Negative.    Psychiatric/Behavioral: Negative.         Performance Status:  Symptomatic; fully ambulatory    Medications:    Current Outpatient Prescriptions   Medication Sig Dispense Refill   • ALBUTEROL IN Inhale 90 mcg Every 4 (Four) Hours.     • bisacodyl (DULCOLAX) 5 MG EC tablet Take 1 tablet by mouth Daily. 60 tablet 4   • omeprazole (priLOSEC) 40 MG capsule Take 1 capsule by mouth Daily. 90 capsule 3   • ondansetron (ZOFRAN) 4 MG tablet Take 1 tablet by mouth Every 8 (Eight) Hours As Needed for Nausea or Vomiting. 30 tablet 5   • potassium chloride (K-DUR) 10 MEQ CR tablet Take 1 tablet by mouth 2 (Two) Times a Day With Meals. 90 tablet 3   • Misc. Devices (BATH/SHOWER SEAT) misc Please provide one shower/bath seat 1 each 0   • Misc. Devices (COMMODE BEDSIDE) misc Please provide bedside commode 1 each 0   • oxyCODONE-acetaminophen (PERCOCET)  MG per tablet Take 1 tablet by mouth Every 6 (Six) Hours As Needed for Moderate Pain . 90 tablet 0     No current facility-administered medications for this visit.        ALLERGIES:    Allergies   Allergen Reactions   • Penicillin V Potassium Unknown (See Comments)       Objective      Vitals:    10/22/18 1105   BP: 112/68   Pulse: 96   Resp: 16   Temp: 97.7 °F (36.5 °C)   TempSrc: Tympanic   SpO2: 94%   Weight: 54 kg (119 lb)   Height: 158.8 cm (62.52\")         Current Status 10/22/2018   ECOG score 2         Physical Exam    General Appearance: Patient is awake, alert, oriented " and in no acute distress. Patient is welldeveloped, wellnourished, and appears stated age.  HEENT: Normocephalic. Sclerae clear, conjunctiva pink, extraocular movements intact, pupils, round, reactive to light and  accommodation. Mouth and throat are clear with moist oral mucosa.  NECK: Supple, no jugular venous distention, thyroid not enlarged.  LYMPH: No cervical, supraclavicular, axillary, or inguinal lymphadenopathy.  CHEST: Equal bilateral expansion, AP  diameter normal, resonant percussion note  LUNGS: Good air movement, no rales, rhonchi, rubs or wheezes with auscultation  CARDIO: Regular sinus rhythm, no murmurs, gallops or rubs.  ABDOMEN: distended,  No tenderness, no guarding, no rebound, No hepatosplenomegaly. No abdominal masses. Bowel sounds positive but diminished. No hernia  GENITALIA: Not examined.  BREASTS: Not examined.  MUSKEL: No joint swelling, decreased motion, or inflammation  EXTREMS: baljeet LE edema, no clubbing,no cyanosis, No varicose veins.  NEURO: Grossly nonfocal, Cognition is preserved.  SKIN: No rashes, no ecchymoses, no petechia.  PSYCH: Oriented to time, place and person. Memory is preserved. Mood and affect appear normal  RECENT LABS:  Lab on 10/22/2018   Component Date Value Ref Range Status   • Glucose 10/22/2018 75  70 - 100 mg/dL Final   • BUN 10/22/2018 44* 5 - 21 mg/dL Final   • Creatinine 10/22/2018 3.44* 0.50 - 1.40 mg/dL Final   • Sodium 10/22/2018 135  135 - 145 mmol/L Final   • Potassium 10/22/2018 5.9* 3.5 - 5.3 mmol/L Final   • Chloride 10/22/2018 100  98 - 110 mmol/L Final   • CO2 10/22/2018 21.0* 24.0 - 31.0 mmol/L Final   • Calcium 10/22/2018 8.0* 8.4 - 10.4 mg/dL Final   • Total Protein 10/22/2018 6.1* 6.3 - 8.7 g/dL Final   • Albumin 10/22/2018 2.90* 3.50 - 5.00 g/dL Final   • ALT (SGPT) 10/22/2018 52  0 - 54 U/L Final   • AST (SGOT) 10/22/2018 252* 7 - 45 U/L Final   • Alkaline Phosphatase 10/22/2018 1167* 24 - 120 U/L Final   • Total Bilirubin 10/22/2018 5.0* 0.1 -  1.0 mg/dL Final   • eGFR Non African Amer 10/22/2018 13* >60 mL/min/1.73 Final    <15 Indicative of kidney failure.   • eGFR   Amer 10/22/2018   >60 mL/min/1.73 Final    <15 Indicative of kidney failure.   • Globulin 10/22/2018 3.2  gm/dL Final   • A/G Ratio 10/22/2018 0.9* 1.1 - 2.5 g/dL Final   • BUN/Creatinine Ratio 10/22/2018 12.8  7.0 - 25.0 Final   • Anion Gap 10/22/2018 14.0* 4.0 - 13.0 mmol/L Final   • WBC 10/22/2018 15.62* 4.80 - 10.80 10*3/mm3 Final   • RBC 10/22/2018 2.80* 4.20 - 5.40 10*6/mm3 Final   • Hemoglobin 10/22/2018 9.0* 12.0 - 16.0 g/dL Final   • Hematocrit 10/22/2018 27.8* 37.0 - 47.0 % Final   • MCV 10/22/2018 99.3* 82.0 - 98.0 fL Final   • MCH 10/22/2018 32.1* 28.0 - 32.0 pg Final   • MCHC 10/22/2018 32.4* 33.0 - 36.0 g/dL Final   • RDW 10/22/2018 22.3* 12.0 - 15.0 % Final   • RDW-SD 10/22/2018 79.0* 40.0 - 54.0 fl Final   • MPV 10/22/2018 9.7  6.0 - 12.0 fL Final   • Platelets 10/22/2018 216  130 - 400 10*3/mm3 Final   • Neutrophil % 10/22/2018 83.6* 39.0 - 78.0 % Final   • Lymphocyte % 10/22/2018 6.6* 15.0 - 45.0 % Final   • Monocyte % 10/22/2018 6.7  4.0 - 12.0 % Final   • Eosinophil % 10/22/2018 0.3  0.0 - 4.0 % Final   • Basophil % 10/22/2018 0.3  0.0 - 2.0 % Final   • Immature Grans % 10/22/2018 2.5  0.0 - 5.0 % Final   • Neutrophils, Absolute 10/22/2018 13.06* 1.87 - 8.40 10*3/mm3 Final   • Lymphocytes, Absolute 10/22/2018 1.03  0.72 - 4.86 10*3/mm3 Final   • Monocytes, Absolute 10/22/2018 1.05  0.19 - 1.30 10*3/mm3 Final   • Eosinophils, Absolute 10/22/2018 0.04  0.00 - 0.70 10*3/mm3 Final   • Basophils, Absolute 10/22/2018 0.05  0.00 - 0.20 10*3/mm3 Final   • Immature Grans, Absolute 10/22/2018 0.39* 0.00 - 0.03 10*3/mm3 Final   • nRBC 10/22/2018 0.0  0.0 - 0.0 /100 WBC Final   Lab on 10/16/2018   Component Date Value Ref Range Status   • Glucose 10/16/2018 81  70 - 100 mg/dL Final   • BUN 10/16/2018 30* 5 - 21 mg/dL Final   • Creatinine 10/16/2018 2.44* 0.50 - 1.40 mg/dL  Final   • Sodium 10/16/2018 132* 135 - 145 mmol/L Final   • Potassium 10/16/2018 5.0  3.5 - 5.3 mmol/L Final   • Chloride 10/16/2018 95* 98 - 110 mmol/L Final   • CO2 10/16/2018 23.0* 24.0 - 31.0 mmol/L Final   • Calcium 10/16/2018 7.9* 8.4 - 10.4 mg/dL Final   • Total Protein 10/16/2018 5.9* 6.3 - 8.7 g/dL Final   • Albumin 10/16/2018 2.80* 3.50 - 5.00 g/dL Final   • ALT (SGPT) 10/16/2018 47  0 - 54 U/L Final   • AST (SGOT) 10/16/2018 343* 7 - 45 U/L Final   • Alkaline Phosphatase 10/16/2018 1190* 24 - 120 U/L Final   • Total Bilirubin 10/16/2018 4.0* 0.1 - 1.0 mg/dL Final   • eGFR Non African Amer 10/16/2018 20* >60 mL/min/1.73 Final   • Globulin 10/16/2018 3.1  gm/dL Final   • A/G Ratio 10/16/2018 0.9* 1.1 - 2.5 g/dL Final   • BUN/Creatinine Ratio 10/16/2018 12.3  7.0 - 25.0 Final   • Anion Gap 10/16/2018 14.0* 4.0 - 13.0 mmol/L Final   • WBC 10/16/2018 10.74  4.80 - 10.80 10*3/mm3 Final   • RBC 10/16/2018 2.93* 4.20 - 5.40 10*6/mm3 Final   • Hemoglobin 10/16/2018 9.2* 12.0 - 16.0 g/dL Final   • Hematocrit 10/16/2018 28.3* 37.0 - 47.0 % Final   • MCV 10/16/2018 96.6  82.0 - 98.0 fL Final   • MCH 10/16/2018 31.4  28.0 - 32.0 pg Final   • MCHC 10/16/2018 32.5* 33.0 - 36.0 g/dL Final   • RDW 10/16/2018 22.0* 12.0 - 15.0 % Final   • RDW-SD 10/16/2018 75.3* 40.0 - 54.0 fl Final   • MPV 10/16/2018 9.6  6.0 - 12.0 fL Final   • Platelets 10/16/2018 411* 130 - 400 10*3/mm3 Final   • Neutrophil % 10/16/2018 81.1* 39.0 - 78.0 % Final   • Lymphocyte % 10/16/2018 7.8* 15.0 - 45.0 % Final   • Monocyte % 10/16/2018 8.8  4.0 - 12.0 % Final   • Eosinophil % 10/16/2018 0.4  0.0 - 4.0 % Final   • Basophil % 10/16/2018 0.5  0.0 - 2.0 % Final   • Immature Grans % 10/16/2018 1.4  0.0 - 5.0 % Final   • Neutrophils, Absolute 10/16/2018 8.72* 1.87 - 8.40 10*3/mm3 Final   • Lymphocytes, Absolute 10/16/2018 0.84  0.72 - 4.86 10*3/mm3 Final   • Monocytes, Absolute 10/16/2018 0.94  0.19 - 1.30 10*3/mm3 Final   • Eosinophils, Absolute  10/16/2018 0.04  0.00 - 0.70 10*3/mm3 Final   • Basophils, Absolute 10/16/2018 0.05  0.00 - 0.20 10*3/mm3 Final   • Immature Grans, Absolute 10/16/2018 0.15* 0.00 - 0.03 10*3/mm3 Final   • nRBC 10/16/2018 0.0  0.0 - 0.0 /100 WBC Final   • Extra Tube 10/16/2018 Hold for add-ons.   Final    Auto resulted.   • Extra Tube 10/16/2018 Hold for add-ons.   Final    Auto resulted.       RADIOLOGY:  Mri Brain With & Without Contrast    Result Date: 10/5/2018  Narrative: HISTORY: Metastatic colon cancer  MRI BRAIN: Multiplanar imaging the brain is performed with and without contrast.  COMPARISON: No prior studies against institution for comparison  FINDINGS: There is no diffusion restriction to indicate acute ischemia. There is moderate cerebellar mild cerebral atrophy. There is no sellar mass. The corpus callosum appears intact. There are diffuse nonenhancing hyperintense FLAIR signal changes seen throughout the periventricular white matter regions. Findings might represent chronic small vessel ischemia, however demyelinating process also considered.. There is no abnormal intracranial enhancement. No evidence of intracranial metastasis. There is no abnormal extra-axial fluid collection.  Limited assessment of the orbits and base of skull is unremarkable. Normal flow void seen in the distal internal carotid and basilar arteries.      Impression: 1. Nonenhancing hyperintense FLAIR signal changes within the periventricular white matter regions may be due to chronic vessel vessel ischemia. Demyelinating process also considered. 2. No abnormal intracranial enhancement. No evidence of intracranial metastasis. 3. No diffusion restriction indicate acute ischemia. No intracranial hemorrhage. This report was finalized on 10/05/2018 15:24 by Dr. Karolina Szymanski MD.    Us Abdomen Complete    Result Date: 10/17/2018  Narrative: EXAMINATION: Ultrasound of the abdomen 10/17/2018.  HISTORY: Abdominal distention.  COMPARISON: PET/CT dated  10/8/2018  PROCEDURE: Multiple longitudinal and transverse grayscale ultrasound images were obtained of the abdomen.  FINDINGS: Redemonstration of a near entire replacement of the liver with innumerable masses. There is no ascites identified. The right and left kidney are unremarkable, without hydronephrosis, discrete stone or obvious mass.  . The spleen is not visualized.    Impression: Ultrasound of the abdomen. 1.  Extensive metastatic burden to the liver. 2.  No hydronephrosis. 3.  No ascites.       This report was finalized on 10/17/2018 10:30 by Dr. Giuliana Delgadillo MD.    Nm Pet Skull Base To Mid Thigh    Result Date: 10/8/2018  Narrative:  PET/CT with F-18 FDG CPT code:  85200 (tumor imaging, skull base to thighs)  INDICATION: Staging; inform initial treatment strategy  CLINICAL HISTORY: Colon cancer  COMPARISON STUDIES: None available.   SERUM GLUCOSE LEVEL: 68 mg/dL  RADIOPHARMACEUTICAL: F-18 FDG 12.52 mCi IV left antecubital fossa  Radiation dose equals  mGy-cm.  Automated exposure control dose reduction technique was implemented.   TECHNIQUE: The study was performed using in-line PET/CT from the skull base to the mid-thigh.  Cross-sectional as well as 3D (MIP) images of the PET data and cross-sectional images of the CT data were generated with co-registration of the 2 image sets.  The PET images are displayed with and without attenuation correction.  CT imaging was employed for attenuation correction and anatomic localization of PET scan abnormalities.  A diagnostic CT with contrast agents was not performed.  FINDINGS:  Head and neck: No abnormal FDG activity within the visualized brain, orbits, paranasal sinuses, pharyngeal soft tissues, and thyroid gland. No FDG-avid cervical lymph nodes.  Chest:  No FDG-avid pulmonary nodules or airspace opacities. Subcarinal lymph node showing maximum SUV of 4 is noted. However, there is adjacent calcification and therefore inflammatory process is not excluded.   . Abdomen: There is a mass in the right lower quadrant/pelvic inlet (image 46 3, series 2) associated with the cecum which shows focal hypermetabolic activity of maximum SUV of 6.8. Additionally, there is diffuse replacement of the liver with hypermetabolic masses, showing maximum SUV of up to 7.8.  Pelvis: Small amount of ascites.  Skeleton: No FDG-avid skeletal lesions.       Impression:  1.  Abnormal hypermetabolic mass in the right lower quadrant and associated with the cecum. This is likely the patient's primary tumor. 2.  Innumerable metastatic lesions throughout the liver. 3. Hypermetabolic subcarinal lymph node with adjacent calcification. Adjacent calcific density may reflect inflammatory process. However, involvement in the metastatic disease is not excluded. Short-term follow-up recommended with CT chest. Alternatively, comparison with prior imaging may be performed. This report was finalized on 10/08/2018 14:57 by Dr. Giuliana Delgadillo MD.           Assessment/Plan  Deb Salinas is a 63 y.o. 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    Patient Active Problem List   Diagnosis   • Screening for other and unspecified endocrine, nutritional, metabolic and immunity disorders   • Hypokalemia   • Colon cancer metastasized to liver (CMS/HCC)   • Malignant neoplasm of colon (CMS/HCC)   • S/P abdominal paracentesis      1.MetCRC:   PET showed knoe righ cecal mass, multiple hepatic lesions and a questionable right subcarinal nodule. Follow-up recommended. Brain MRI negative for parenchymal metastasis.Port placed  --labs reviewed,Abdominal US showed no ascities.  Will adjust and treat for renal function    2. FEN: potassium okay  --she has zofran for nausea.    3.Abdominal pain: rx for percocet 10/325 since she did not get adequate pain control with 5/325    4. Prophylaxis: dulcolax for constipation  5. GERD: rx omeprazole to her pharmacy  6. Kidney failure: acute  with tumor. Chemo adjusted for renal fuction. Pt not eating much, will plan for labs on weds and fri and give fluid        Daphney Acosta MD    10/22/2018    11:35 AM

## 2018-10-23 PROBLEM — E87.5 HYPERKALEMIA: Status: ACTIVE | Noted: 2018-01-01

## 2018-10-23 NOTE — PROGRESS NOTES
10-22 - Social work consult requested due to daughter of patient whom is present being very distraught.  Her mother is being treated for colon cancer with liver mets.  She is receiving chemotherapy today and is a patient of Dr. CORDOBA.  Daughter is very overwhelmed due to caretaking of her mother in the home for the last 2 months with no help.  She is also the mother of 4 small children.  No other family is here and patient lacks Orthodoxy or support system.  She reports that her mother is barely able to get out of bed, has started falling, is unable to do much of anything by herself and she is scared to leave her unattended.   She doesn't think her mother understands the severity of her prognosis and to her knowledge that conversation has not been had with her nor has she had this with her mom.  She is in a lot of pain which is difficult to manage other than oral percocet due to liver and kidney functions being so poor.  The daughter doesn't want her mother thinking she has given up on her but she doesn't see chemo helping and she needs assistance in the home.  Daughter is extremely tearful during conversation.  Affirmed and normalized feelings and encouraged her to use her own coping skills (talking with friends) to navigate these decisions and very difficult time.  Discussed options of a home health referral to begin receiving skilled nursing assistance in the home vs a hospice referral.  Daughter is unsure if she is ready to make that decision and would like some time to think about it.  Gave patient educational information on end of life care/palliative care, local hospice and home health agencies, PADD.  Encouraged her to consider attending caregiver support group that meets here at hospital.  She reports that going on-line and reading other people's stories has been helpful for her.  Also recommended that patient and daughter alone or together consider meeting with one of our chaplains if necessary.  Daughter reports  that they have taken care of will, power of  but not advanced care directive which advised that we could do here.  Daughter reports that patient has appt with Dr. CORDOBA next Monday and they will meet with me at this time to move forward with decided needs that are best.  Encouraged daughter to contact this worker for counseling, questions or other things that may come up.

## 2018-10-23 NOTE — TELEPHONE ENCOUNTER
Received phone call from patients daughter, she has concerns and questions regarding her mother. Patient received 1st chemotherapy txt yesterday 10/22/18 (patients choice). Daughter states she is not doing well at all today, states she has c/o shooting pain located in her abdominal area, little urine out put today which is dark yellow in color, unable to eat and when she tried drinking a [place] Instant Breakfast drink earlier this afternoon she began to experience extreme pain right side abdominal area with severe nausea and vomited, and continued bi-lat lower extremity swelling edema 3+ pitting. Daughter questions if this is normal side effects post chemotherapy txt. Patient is melissa for labs and IV fluids in the am Wed 10/24/18, but daughter is unsure she will be able to wait that long. Daughter was encouraged to go ahead and bring patient in this afternoon through the ER Dep for eval and lab work, so her labs can be checked, given IV fluids, pain relief and get her comfortable. She v/u and will bring mother to ER Dept for eval this afternoon.

## 2018-10-24 NOTE — PROGRESS NOTES
1300 Recd call from YING Edward, pt nurse, to DC pump. DC'd pump, flushed CVAD w/20cc NS. Left port accessed for YING Edward, to flush her port w/heparin. She will DC do needle.

## 2018-10-27 PROBLEM — C18.9 ADENOCARCINOMA OF COLON METASTATIC TO LIVER (HCC): Chronic | Status: ACTIVE | Noted: 2018-01-01

## 2018-10-27 PROBLEM — C78.7 ADENOCARCINOMA OF COLON METASTATIC TO LIVER (HCC): Chronic | Status: ACTIVE | Noted: 2018-01-01

## 2018-10-27 PROBLEM — N17.9 ACUTE RENAL FAILURE (HCC): Chronic | Status: ACTIVE | Noted: 2018-01-01

## 2018-10-27 PROBLEM — R10.9 INTRACTABLE ABDOMINAL PAIN: Chronic | Status: ACTIVE | Noted: 2018-01-01

## 2018-10-27 PROBLEM — R64 MALIGNANT CACHEXIA (HCC): Chronic | Status: ACTIVE | Noted: 2018-01-01

## 2018-10-30 ENCOUNTER — APPOINTMENT (OUTPATIENT)
Dept: ONCOLOGY | Facility: HOSPITAL | Age: 63
End: 2018-10-30
Attending: INTERNAL MEDICINE

## 2018-11-01 ENCOUNTER — APPOINTMENT (OUTPATIENT)
Dept: ONCOLOGY | Facility: HOSPITAL | Age: 63
End: 2018-11-01
Attending: INTERNAL MEDICINE

## 2018-11-05 ENCOUNTER — APPOINTMENT (OUTPATIENT)
Dept: ONCOLOGY | Facility: HOSPITAL | Age: 63
End: 2018-11-05
Attending: INTERNAL MEDICINE

## (undated) DEVICE — STANDARD SURGICAL GOWN, L: Brand: CONVERTORS

## (undated) DEVICE — 3M™ STERI-STRIP™ REINFORCED ADHESIVE SKIN CLOSURES, R1542, 1/4 IN X 1-1/2 IN (6 MM X 38 MM), 6 STRIPS/ENVELOPE: Brand: 3M™ STERI-STRIP™

## (undated) DEVICE — 3M™ TEGADERM™ TRANSPARENT FILM DRESSING FRAME STYLE, 1626, 4 IN X 4-3/4 IN (10 CM X 12 CM), 50/CT 4CT/CASE: Brand: 3M™ TEGADERM™

## (undated) DEVICE — 9165 UNIVERSAL PATIENT PLATE: Brand: 3M™

## (undated) DEVICE — C-ARM: Brand: UNBRANDED

## (undated) DEVICE — SUTURE VCRL SZ 2-0 L27IN ABSRB UD L26MM SH 1/2 CIR J417H

## (undated) DEVICE — STERILE LATEX POWDER FREE SURGICAL GLOVES WITH HYDROGEL COATING: Brand: PROTEXIS

## (undated) DEVICE — PLATE ES AD W 9FT CRD 2

## (undated) DEVICE — AIRLIFE™ NASAL OXYGEN CANNULA CURVED, NONFLARED TIP, WITH 7' FEET (2.1 M) CRUSH RESISTANT TUBING, OVER-THE-EAR STYLE: Brand: AIRLIFE™

## (undated) DEVICE — CHLORAPREP 26ML ORANGE

## (undated) DEVICE — SUTURE VCRL SZ 4-0 L18IN ABSRB UD L19MM PS-2 3/8 CIR PRIM J496H

## (undated) DEVICE — MAJOR BSIN SETUP PK

## (undated) DEVICE — ELECTROSURGICAL PENCIL BUTTON SWITCH NON COATED BLADE ELECTRODE 10 FT (3 M) CORD HOLSTER: Brand: MEGADYNE

## (undated) DEVICE — SUTURE VCRL SZ 3-0 L27IN ABSRB UD L26MM SH 1/2 CIR J416H

## (undated) DEVICE — ADHESIVE LIQ MASTISOL ST

## (undated) DEVICE — THREE QUARTER SHEET: Brand: CONVERTORS

## (undated) DEVICE — SPONGE GZ W4XL4IN RAYON POLY FILL CVR W/ NONWOVEN FAB